# Patient Record
Sex: FEMALE | Race: WHITE | NOT HISPANIC OR LATINO | Employment: OTHER | URBAN - METROPOLITAN AREA
[De-identification: names, ages, dates, MRNs, and addresses within clinical notes are randomized per-mention and may not be internally consistent; named-entity substitution may affect disease eponyms.]

---

## 2017-01-03 ENCOUNTER — OFFICE VISIT (OUTPATIENT)
Dept: WOUND CARE | Facility: HOSPITAL | Age: 57
End: 2017-01-03
Attending: SURGERY

## 2017-01-03 NOTE — WOUND CARE CLINIC NOTE
DATE OF SERVICE:   01/03/2017    ADMITTING DIAGNOSIS:  Chronic left olecranon ulcer.     HISTORY OF PRESENT ILLNESS: This 56-year-old female has a history of a chronic left olecranon ulcer. The patient underwent debridement on her last visit. She currently is on a Dakin dressing change at the site. The patient reports she is doing well. She has no history of fever or chills.     PHYSICAL EXAMINATION:  GENERAL: On today's exam, the patient is awake and alert in no apparent distress.   VITAL SIGNS: Temperature is 97.6, pulse 83, respirations 18, blood pressure 148/80.  9  EXTREMITIES: Over the left olecranon region the ulcer measures 1.5 x 1.0 cm.  There is slough centrally. No erythema. Drainage is serosanguineous. The ulcer appears to be kwasi over the border.     ASSESSMENT: The patient's ulcer would benefit from debridement on today's visit to remove additional necrotic tissue and promote healing. The patient will need to continue with her current dressing care.     DESCRIPTION OF PROCEDURE: The patient was placed supine on the procedure table. The left olecranon ulcer was prepped with a wound prep and anesthetized with a 4% lidocaine gel. After adequate anesthesia, the ulcer was debrided to the subcutaneous tissue plane in an excisional manner with #4 curette. The debridement zone was 1.5 x 1.1 cm.  This was performed through viable subcutaneous tissue including the removal of viable subcutaneous tissue. Postdebridement measurements were 1.5 x 1.1 cm with a depth of 0.8 cm.  Punctate bleeding was encountered. Pressure was used to control hemorrhage. The patient tolerated the procedure well.       ZACKARY Flannery:sona  D:   01/03/2017 09:25:53  T:   01/03/2017 09:46:22  Job ID:   [id number]  Document ID:   [id number]  Rev:  0  cc:   America Barragan M.D.

## 2017-01-10 ENCOUNTER — OFFICE VISIT (OUTPATIENT)
Dept: WOUND CARE | Facility: HOSPITAL | Age: 57
End: 2017-01-10
Attending: SURGERY

## 2017-01-11 NOTE — WOUND CARE CLINIC NOTE
DATE OF SERVICE: 01/10/2017    ADMITTING DIAGNOSES: Chronic left olecranon ulcer.     HISTORY OF PRESENT ILLNESS: This 56-year-old female has a history of a chronic left olecranon ulcer. The patient underwent debridement of her ulcer in the past. The patient is doing well with the Dakin’s dressing on today's visit.    PHYSICAL EXAMINATION:   GENERAL: The patient is awake, alert, in no apparent distress.   VITAL SIGNS: Temperature is 97.8, pulse 83, respirations 24, blood pressure is 178/80.   EXTREMITIES: Over the left olecranon, the ulcer measures 1.5 x 1 cm. There is healthy granular base. No erythema. Drainage is serosanguineous. There is less edema to the olecranon region.     ASSESSMENT AND PLAN: Mrs. Colón’s left olecranon ulcer is progressing well; however, she will require continued debridement to help close the ulcer. The patient will need to continue with her current dressing.     DESCRIPTION OF PROCEDURE: The patient was placed in the right lateral decubitus position. The left olecranon ulcer was prepped with wound prep and anesthetized with 4% lidocaine gel. After adequate anesthesia, the ulcer was debrided through the subcutaneous tissue plane in an excisional manner with a #4 curette. Debridement zone was 1.6 x 1.2 cm. This was performed through viable subcutaneous tissue and includes removal of viable subcutaneous tissue. Punctate bleeding was encountered. Pressure was utilized to control hemorrhage. Postdebridement measurements were 1.6 x 1.2 cm with a depth of 0.3 cm. Dressings were applied. The patient tolerated the procedure well.       Herson Godfrey M.D.  RM:dwight  D:   01/10/2017 10:16:10  T:   01/10/2017 23:09:09  Job ID:   [id number]  Document ID:   [id number]  Rev:  0  cc:   America Barragan M.D.

## 2017-01-24 ENCOUNTER — OFFICE VISIT (OUTPATIENT)
Dept: WOUND CARE | Facility: HOSPITAL | Age: 57
End: 2017-01-24
Attending: SURGERY

## 2017-01-25 NOTE — WOUND CARE CLINIC NOTE
DATE OF SERVICE: 01/24/2017    ADMITTING DIAGNOSIS: Chronic left olecranon ulcer.     HISTORY OF PRESENT ILLNESS: This 56-year-old female returns to the Wound Care Clinic for followup of her left chronic olecranon ulcer. The patient reports she is doing well with her Dakin’s dressing. She had some dryness noted over the peripheral border of the ulcer.    PHYSICAL EXAMINATION:   GENERAL: On today's exam, the patient is awake, alert, in no apparent distress.   VITAL SIGNS: Temperature is 97.7, pulse 86, respirations 18, blood pressure is 155/83.   EXTREMITIES: Over the left extremity olecranon region, the ulcer measures 1.5 x 0.9 cm. There is a granular base. No erythema. There is a shallow tunnel distally approximately 0.8 cm.     ASSESSMENT AND PLAN: Mrs. Colón’s ulcer is progressing well. On today's visit, the ulcer will require debridement through the subcutaneous tissue plane to remove additional tissue and promote contracture. The patient will need to continue with her current dressing. She may add a dab of Vaseline to the peripheral border to help control the drying effect of the Dakin’s solution.     DESCRIPTION OF PROCEDURE: The patient was placed supine on the procedure table. The left olecranon region was prepped with a wound prep and anesthetized with 4% lidocaine gel. After adequate anesthesia, the ulcer was debrided through the subcutaneous tissue plain in an excisional manner with a #4 curette. The debridement zone was 1.5 x 0.9 cm. This was performed through viable subcutaneous tissue and included the removal of viable subcutaneous tissue. Punctate bleeding was encountered. Pressure was utilized to control hemorrhage. The post debridement measurements were 1.6 x 0.9 cm with a depth of 0.6 cm. The patient tolerated the procedure well. Dressings were applied.      ZACKARY Flannery:dwight  D:   01/24/2017 10:40:42  T:   01/24/2017 23:51:01  Job ID:   [id number]  Document ID:   [id number]  Rev:   0  cc:   America Barragan M.D.

## 2017-02-07 ENCOUNTER — OFFICE VISIT (OUTPATIENT)
Dept: WOUND CARE | Facility: HOSPITAL | Age: 57
End: 2017-02-07
Attending: SURGERY

## 2017-02-08 NOTE — WOUND CARE CLINIC NOTE
DATE OF SERVICE: 02/07/2017    ADMITTING DIAGNOSIS: Chronic left olecranon ulcer.     HISTORY OF PRESENT ILLNESS: This 56-year-old female returns to the Wound Care Center for followup of her left olecranon chronic ulcer. The patient reports that she is performing her Dakin’s dressing care without problems.     PHYSICAL EXAMINATION:  GENERAL: On today's exam, the patient is awake, alert, in no apparent distress.   VITAL SIGNS: Temperature is 98.1, pulse 93, respirations 20, blood pressure is 162/99.  EXTREMITIES: Over the left olecranon region, the ulcer measures 1.5 x 0.5 cm. There is a granular base. No erythema. Drainage is serosanguineous. There is reepithelialization occurring over the border.    ASSESSMENT AND PLAN: Mrs. Colón's left olecranon ulcer will require debridement on today's visit to promote continued contraction. The patient will need to continue with her current dressing care which consists of a 1/4 strength Dakin’s dressing twice daily. The patient will return to the Wound Care Clinic in 2 weeks.     DESCRIPTION OF PROCEDURE: The patient was placed supine on the procedure table. The left olecranon ulcer was anesthetized with 4% lidocaine gel. After adequate anesthesia, the ulcer was debrided through the subcutaneous tissue plane in an excisional manner with a #4 curette. The debridement zone was 1.6 x 0.7 cm. This was performed through viable subcutaneous tissue and includes removal of viable subcutaneous tissue. Postdebridement measurements were 1.6 x 0.7 cm with a depth of 0.7 cm. The patient tolerated the procedure well. Dressings were applied. Pressure was utilized to control hemorrhage.      ZACKARY Flannery:dwight  D:   02/07/2017 10:05:59  T:   02/07/2017 22:22:10  Job ID:   [id number]  Document ID:   [id number]  Rev:  0  cc:   America Barragan M.D.

## 2017-02-21 ENCOUNTER — OFFICE VISIT (OUTPATIENT)
Dept: WOUND CARE | Facility: HOSPITAL | Age: 57
End: 2017-02-21
Attending: SURGERY

## 2017-02-21 NOTE — WOUND CARE CLINIC NOTE
DATE OF SERVICE:  02/21/2017      ADMITTING DIAGNOSIS: Chronic left olecranon ulcer.     HISTORY OF PRESENT ILLNESS: This 56-year-old female returns to the Wound Care Center for follow up of her chronic olecranon ulcer. The patient reports that she is performing her Dakin’s dressing. She has no history of fever or chills.     PHYSICAL EXAMINATION:  GENERAL: On today's exam, the patient is awake, alert, in no apparent distress.   VITAL SIGNS: Temperature is 98.2, pulse is 84, respirations 18, blood pressure 130/72.   EXTREMITIES: Over the left olecranon region, the ulcer measures 1.4 x 0.6 cm. There are signs of continued contraction over the border. No erythema. Drainage is serosanguineous. There is no sign of deep fluctuance to the olecranon region.     ASSESSMENT AND PLAN:   1.  The patient’s left upper extremity olecranon ulcer appears stable. On today's visit, the ulcer will be debrided.   2.  The patient may need to continue with her Dakin’s dressing.     DESCRIPTION OF PROCEDURE: The patient was placed supine on the procedure table. The left elbow was prepped with a wound prep and anesthetized with 4% lidocaine gel. After adequate anesthesia, the ulcer over the left olecranon region was debrided with a #4 curette through the deep subcutaneous tissue plane in an excisional manner. The debridement zone was 1.4 x 0.7 cm. The patient tolerated the procedure well. This was performed through viable subcutaneous tissue and included removal of viable subcutaneous tissue. Punctate bleeding was encountered. Pressure was utilized to control hemorrhage. Postdebridement measurements were 1.4 x 0.7 cm with a depth of 0.8 cm. Dressings were applied.           Herson Godfrey M.D.  RM:adamaris  D:   02/21/2017 09:19:48  T:   02/21/2017 10:16:29  Job ID:   [id number]  Document ID:   [id number]  Rev:  0  cc:   America Barragan M.D.

## 2017-03-07 ENCOUNTER — OFFICE VISIT (OUTPATIENT)
Dept: WOUND CARE | Facility: HOSPITAL | Age: 57
End: 2017-03-07
Attending: SURGERY

## 2017-03-07 NOTE — WOUND CARE CLINIC NOTE
DATE OF SERVICE: 03/07/2017    ADMITTING DIAGNOSIS: Chronic left olecranon ulcer.     HISTORY OF PRESENT ILLNESS: This 56-year-old female returns to the Wound Care Center for followup of her left chronic olecranon ulcer. The patient reports she is doing well. She has no history of fever or chills.     PHYSICAL EXAMINATION:  GENERAL: On today's exam the patient is awake, alert, in no apparent distress.   VITAL SIGNS: Temperature 97.9, pulse 89, respiratory rate 18, blood pressure 146/98.   EXTREMITIES: Over the left olecranon region, there is an open ulcer. The ulcer measures 1.3 x 0.6 cm. The ulcer will not close due to significant tunneling,  approximately 2 cm in each direction. There is no erythema present.     ASSESSMENT: The patient will require debridement of the ulcer on today's visit through the subcutaneous tissue plane to remove additional tissue and promote closure.     DESCRIPTION OF PROCEDURE: The patient was placed supine on the procedure table. The ulcer was prepped with wound prep. The ulcer was anesthetized over the lateral border of the skin with 1% lidocaine with epinephrine, 10 mL total. After adequate anesthesia, the ulcer was debrided through the subcutaneous tissue plane in an excisional manner with a #10 blade. The debridement zone was 4 x 2 cm. This was performed through viable subcutaneous tissue and included the removal of viable subcutaneous tissue. Punctate bleeding was encountered. Pressure was utilized to control hemorrhage along with silver nitrate. Postdebridement measurements were 4 x 2 cm with a depth of 0.7 cm. The patient tolerated the procedure well. Dressings were applied.         Herson Godfrey M.D.  RM:yordy  D:   03/07/2017 09:27:43  T:   03/07/2017 15:45:31  Job ID:   [id number]  Document ID:   [id number]  Rev:  0  cc:   America Barragan M.D.

## 2017-03-14 ENCOUNTER — OFFICE VISIT (OUTPATIENT)
Dept: WOUND CARE | Facility: HOSPITAL | Age: 57
End: 2017-03-14
Attending: SURGERY

## 2017-03-14 ENCOUNTER — APPOINTMENT (OUTPATIENT)
Dept: WOUND CARE | Facility: HOSPITAL | Age: 57
End: 2017-03-14
Attending: SURGERY

## 2017-03-14 PROCEDURE — G0463 HOSPITAL OUTPT CLINIC VISIT: HCPCS

## 2017-03-15 NOTE — WOUND CARE CLINIC NOTE
DATE OF SERVICE:  03/14/2017    ADMITTING DIAGNOSIS:  Chronic left olecranon ulcer.     HISTORY OF PRESENT ILLNESS: This 56-year-old female returns to the Wound Care Center for followup of her left chronic olecranon ulcer. The patient reports no significant drainage this week. She had some discomfort over the olecranon. She had this region debrided last week. The patient has no fever or chills.     PHYSICAL EXAMINATION:  GENERAL: On today's exam, the patient is awake, alert, in no apparent distress.   VITAL SIGNS: Temperature is 97.4, pulse 88, respiratory rate 18, blood pressure 126/85.   EXTREMITIES: Over the left olecranon, the ulcer measures 3.6 x 1.6 cm.  There is a granular bed forming. No erythema. Drainage is serosanguineous. There appears to be no significant tunneling present.  The site is without new soft tissue loss.     ASSESSMENT AND PLAN: The patient is progressing well. On today's visit, she did not require any debridement. The patient will need to continue with a Dakin based dressing to help provide removal of the bacterial chasity over the region and promote contraction. The patient will need to return to the Wound Care clinic in 2 weeks.       Herson Godfrey M.D.  RM:stephanie  D:   03/14/2017 17:06:28  T:   03/14/2017 20:21:55  Job ID:   [id number]  Document ID:   [id number]  Rev:  0  cc:   America Barragan M.D.

## 2017-03-28 ENCOUNTER — OFFICE VISIT (OUTPATIENT)
Dept: WOUND CARE | Facility: HOSPITAL | Age: 57
End: 2017-03-28
Attending: SURGERY

## 2017-03-28 NOTE — WOUND CARE CLINIC NOTE
ADMITTING DIAGNOSIS: Chronic left olecranon ulcer.     HISTORY OF PRESENT ILLNESS: This 57-year-old female returns to the wound care center for followup of her left olecranon ulcer. The patient reports that she is doing well. She has less drainage from the site. She has no history of fevers.     PHYSICAL EXAMINATION:  GENERAL: On today's exam, the patient is awake, alert, in no apparent distress.   VITAL SIGNS: Temperature is 97.7, pulse is 80, respirations 18, blood pressure is 136/83.   EXTREMITIES: Over the left olecranon, the ulcer measures 3.3 x 1.6 cm. There is evidence of contraction over the border. No erythema. There is a granular base forming centrally.     ASSESSMENT: The patient would benefit from debridement of the ulcer on today's visit with continued use of the same dressing. The patient will need to return to wound care clinic in 2 weeks.      DESCRIPTION OF PROCEDURE: The patient was placed supine on the procedure table. The left olecranon ulcer was prepped with wound prep. The ulcer was anesthetized with 4% lidocaine gel. After adequate anesthesia, the left olecranon ulcer was debrided through the subcutaneous tissue plane in an excisional manner with a #4 curette. Debridement zone was 3.4 x 1.7 cm. This was performed through viable subcutaneous tissue and includes removal of viable subcutaneous tissue. Punctate bleeding was encountered. Postdebridement measurements were 3.4 x 1.7 cm with a depth of  0.5 cm. Dressings were applied.          Herson Godfrey M.D.  RM:buck  D:   03/28/2017 09:14:03  T:   03/28/2017 14:43:34  Job ID:   [id number]  Document ID:   [id number]  Rev:  0  cc:   America Barragan M.D.

## 2017-04-11 ENCOUNTER — OFFICE VISIT (OUTPATIENT)
Dept: WOUND CARE | Facility: HOSPITAL | Age: 57
End: 2017-04-11
Attending: SURGERY

## 2017-04-11 NOTE — WOUND CARE CLINIC NOTE
DATE OF SERVICE: 04/11/2017    ADMITTING DIAGNOSIS: Left chronic olecranon ulcer.     HISTORY OF PRESENT ILLNESS: This 57-year-old female returns to the Wound Care Center for followup of her left olecranon ulcer. The patient is undergoing Dakin’s dressing care to the site twice daily. The patient has no new complaints over the site.     PHYSICAL EXAMINATION:  GENERAL: On today's exam, the patient is awake, alert, in no apparent distress.   VITAL SIGNS: Temperature is 96.8, pulse is 81, respirations 18, blood pressure is 145/83.   SKIN: Over the left olecranon, the ulcer measures 3.1 x 1.6 cm. There is gradual contraction over the border granular base forming. No erythema. There is some tissue centrally which is desiccated. There is no sign of purulence to the ulcer. The floor appears to be rising.     ASSESSMENT AND PLAN: Mrs. Colón is progressing well. On today's visit, the ulcer will require debridement to stimulate new tissue growth and closure. The patient will need to continue with her current dressing.     DESCRIPTION OF PROCEDURE: The patient was placed supine on the procedure table. The left olecranon ulcer was prepped with wound prep. The ulcer was anesthetized with 4% lidocaine gel. After adequate anesthesia, the ulcer over the left elbow was debrided through the subcutaneous tissue plane in an excisional manner with a #4 curette. The debridement zone was 3.2 x 1.7 cm. This was performed through viable subcutaneous tissue and includes removal of viable subcutaneous tissue. Punctate bleeding was encountered. Pressure was utilized to control hemorrhage. Postdebridement measurements were 3.2 x 1.7 cm. The patient tolerated the procedure well. Dressings were applied.       ZACKARY Flannery:dwight  D:   04/11/2017 08:45:38  T:   04/11/2017 18:12:40  Job ID:   [id number]  Document ID:   [id number]  Rev:  0  cc:   Colby Arboleda M.D.

## 2017-05-02 ENCOUNTER — APPOINTMENT (OUTPATIENT)
Dept: WOUND CARE | Facility: HOSPITAL | Age: 57
End: 2017-05-02
Attending: SURGERY

## 2017-05-09 ENCOUNTER — APPOINTMENT (OUTPATIENT)
Dept: WOUND CARE | Facility: HOSPITAL | Age: 57
End: 2017-05-09
Attending: SURGERY

## 2017-06-06 ENCOUNTER — APPOINTMENT (OUTPATIENT)
Dept: WOUND CARE | Facility: HOSPITAL | Age: 57
End: 2017-06-06
Attending: SURGERY

## 2017-06-20 ENCOUNTER — APPOINTMENT (OUTPATIENT)
Dept: WOUND CARE | Facility: HOSPITAL | Age: 57
End: 2017-06-20
Attending: SURGERY

## 2017-07-11 ENCOUNTER — APPOINTMENT (OUTPATIENT)
Dept: WOUND CARE | Facility: HOSPITAL | Age: 57
End: 2017-07-11
Attending: SURGERY

## 2017-08-01 ENCOUNTER — APPOINTMENT (OUTPATIENT)
Dept: WOUND CARE | Facility: HOSPITAL | Age: 57
End: 2017-08-01
Attending: SURGERY

## 2017-08-22 ENCOUNTER — APPOINTMENT (OUTPATIENT)
Dept: WOUND CARE | Facility: HOSPITAL | Age: 57
End: 2017-08-22
Attending: SURGERY

## 2017-09-12 ENCOUNTER — APPOINTMENT (OUTPATIENT)
Dept: WOUND CARE | Facility: HOSPITAL | Age: 57
End: 2017-09-12
Attending: SURGERY

## 2017-10-17 ENCOUNTER — APPOINTMENT (OUTPATIENT)
Dept: WOUND CARE | Facility: HOSPITAL | Age: 57
End: 2017-10-17
Attending: SURGERY

## 2017-11-07 ENCOUNTER — APPOINTMENT (OUTPATIENT)
Dept: WOUND CARE | Facility: HOSPITAL | Age: 57
End: 2017-11-07
Attending: SURGERY

## 2017-11-14 ENCOUNTER — APPOINTMENT (OUTPATIENT)
Dept: WOUND CARE | Facility: HOSPITAL | Age: 57
End: 2017-11-14
Attending: SURGERY

## 2017-12-05 ENCOUNTER — APPOINTMENT (OUTPATIENT)
Dept: WOUND CARE | Facility: HOSPITAL | Age: 57
End: 2017-12-05
Attending: SURGERY

## 2017-12-12 ENCOUNTER — APPOINTMENT (OUTPATIENT)
Dept: WOUND CARE | Facility: HOSPITAL | Age: 57
End: 2017-12-12
Attending: SURGERY

## 2018-01-09 ENCOUNTER — APPOINTMENT (OUTPATIENT)
Dept: WOUND CARE | Facility: HOSPITAL | Age: 58
End: 2018-01-09
Attending: SURGERY

## 2018-03-27 ENCOUNTER — APPOINTMENT (OUTPATIENT)
Dept: WOUND CARE | Facility: HOSPITAL | Age: 58
End: 2018-03-27
Attending: SURGERY

## 2018-03-27 PROCEDURE — G0463 HOSPITAL OUTPT CLINIC VISIT: HCPCS

## 2018-04-10 ENCOUNTER — APPOINTMENT (OUTPATIENT)
Dept: WOUND CARE | Facility: HOSPITAL | Age: 58
End: 2018-04-10
Attending: SURGERY

## 2018-04-24 ENCOUNTER — APPOINTMENT (OUTPATIENT)
Dept: WOUND CARE | Facility: HOSPITAL | Age: 58
End: 2018-04-24
Attending: SURGERY

## 2018-05-08 ENCOUNTER — APPOINTMENT (OUTPATIENT)
Dept: WOUND CARE | Facility: HOSPITAL | Age: 58
End: 2018-05-08
Attending: SURGERY

## 2018-05-22 ENCOUNTER — OFFICE VISIT (OUTPATIENT)
Dept: WOUND CARE | Facility: HOSPITAL | Age: 58
End: 2018-05-22
Attending: SURGERY

## 2018-05-22 DIAGNOSIS — M70.32: Primary | ICD-10-CM

## 2018-05-22 PROCEDURE — 87070 CULTURE OTHR SPECIMN AEROBIC: CPT | Performed by: SURGERY

## 2018-05-22 PROCEDURE — 87205 SMEAR GRAM STAIN: CPT | Performed by: SURGERY

## 2018-05-22 PROCEDURE — 87075 CULTR BACTERIA EXCEPT BLOOD: CPT | Performed by: SURGERY

## 2018-05-24 ENCOUNTER — CLINICAL SUPPORT (OUTPATIENT)
Dept: OTHER | Facility: HOSPITAL | Age: 58
End: 2018-05-24

## 2018-05-24 ENCOUNTER — HOSPITAL ENCOUNTER (OUTPATIENT)
Dept: PET IMAGING | Facility: HOSPITAL | Age: 58
Discharge: HOME | End: 2018-05-24
Admitting: CLINICAL NURSE SPECIALIST

## 2018-05-24 DIAGNOSIS — F17.210 SMOKING GREATER THAN 30 PACK YEARS: ICD-10-CM

## 2018-05-24 DIAGNOSIS — Z12.2 SCREENING FOR MALIGNANT NEOPLASM OF RESPIRATORY ORGAN: ICD-10-CM

## 2018-05-24 DIAGNOSIS — Z12.2 SCREENING FOR MALIGNANT NEOPLASM OF RESPIRATORY ORGAN: Primary | ICD-10-CM

## 2018-05-24 PROCEDURE — G0296 VISIT TO DETERM LDCT ELIG: HCPCS | Performed by: CLINICAL NURSE SPECIALIST

## 2018-05-24 PROCEDURE — G0297 LDCT FOR LUNG CA SCREEN: HCPCS

## 2018-05-25 VITALS — BODY MASS INDEX: 36.18 KG/M2 | HEIGHT: 64 IN | WEIGHT: 211.9 LBS

## 2018-05-25 LAB
BACTERIA SPEC AEROBE CULT: NORMAL
GRAM STN SPEC: NORMAL

## 2018-05-26 NOTE — PROGRESS NOTES
"[facility] Low-Dose Lung Cancer CT Screening Visit    María Colón is a pleasant 58 y.o. female seen today at the request of Dr. Rufino Ruvalcaba in our Multidisciplinary Clinic, being seen for Lung Cancer Screening Counseling and a Shared Decision Making Visit prior to Low-Dose Lung Cancer Screening CT exam.    SMOKING HISTORY:   History   Smoking Status   • Current Every Day Smoker   • Packs/day: 0.50   • Types: Cigarettes   Smokeless Tobacco   • Current User     Comment: Began smoking at age 17.  Smoked 1 ppd for 31 years and .5 ppd for 10 years for a 36 pack year history.       María Colón is currently smoking a half pack per day with a 36 pack year history.  Reports no use of alternate forms of tobacco, marijuana or other substances.  She uses an electronic cigarette but states that she \"doesn't inhale\".  Based on the recommendation of the [place] Preventive Services Task Force, this patient is at high risk for lung cancer and a low-dose CT screening scan is recommended.     We discussed the connection between Radon and Lung Cancer and the availability of free test kits.  She does not have a basement.  The patient reports NO occupational exposure to hazardous chemicals known to increase risk for lung cancer.  Some second-hand smoke exposure as a child with both parents smoking in their home.  NO current second hand exposure.    The patient has had no hemoptysis, unintentional weight loss or increasing shortness of breath. The patient is asymptomatic and has no signs or symptoms of lung cancer.   The patient reports a personal history of a very early stage  Ovarian Cancer.  Additionally, reports family history of lung cancer in her mother.  She has COPD.    Together we discussed the potential benefits and potential harms of being screened for lung cancer including the potential for follow-up diagnostic testing, risk for over diagnosis, false positive rate and total radiation exposure " using the [facility] standardized decision aid. We reviewed the patient's smoking history and counseled on the importance and health benefits of quitting smoking.    Smoking Cessation  DISCUSSION HELD TODAY:     We discussed that there are a number of resources and tobacco cessation interventions to assist with smoking cessation including the 1-800-Quit Now line, Health Department programs, [place] [facility] resources, and use of the [place] Department of Health and Human Services five keys for quitting and quit plan worksheet.  On a scale of zero to ten, the patient rates their motivation and readiness to quit at a 9 out of 10 today.      Recommendations for continued lung cancer screening:      We discussed the NCCN guidelines for lung cancer screening and the patient verbalized understanding that annual screening is recommended until fifteen years beyond smoking as long as they have no other disease or comorbidity that would prevent them from receiving cancer treatments such as surgery should a lung cancer be detected. The [facility] Lung Cancer Screening Shared Decision-Making Tool was utilized as an aid in discussing whether or not screening was right. The patient has decided to proceed with a Low Dose Lung Cancer Screening CT today. The patient is aware that the results of his screening will be shared with the referring provider or PCP as well.       The patient verbalizes understanding that results of this low dose lung CT exam will be called and that assistance will be provided in arranging any necessary follow-up.    After review of the NCCN guidelines and recommendations for ongoing screening, the patient verbalized understanding of recommendations for follow-up. We discussed the importance of adherence to continued annual screening until 15 years beyond smoking or until other life-limiting comorbidities are present. We discussed the impact of comorbidities and ability and willing  to undergo diagnosis and treatment.    The patient has been counseled on the health benefits of quitting tobacco, smoking cessation strategies and resources, as well as the importance of adherence to annual lung cancer low-dose CT screening.     Fatou Gonzalez, MSN, APRN, ACNS-BC, AOCN, CHPN  Clinical Nurse Specialist  [facility]

## 2018-05-27 LAB — BACTERIA SPEC ANAEROBE CULT: NORMAL

## 2018-06-05 ENCOUNTER — APPOINTMENT (OUTPATIENT)
Dept: PREADMISSION TESTING | Facility: HOSPITAL | Age: 58
End: 2018-06-05

## 2018-06-05 ENCOUNTER — OFFICE VISIT (OUTPATIENT)
Dept: WOUND CARE | Facility: HOSPITAL | Age: 58
End: 2018-06-05
Attending: SURGERY

## 2018-06-05 VITALS
HEART RATE: 72 BPM | BODY MASS INDEX: 36.86 KG/M2 | WEIGHT: 208 LBS | SYSTOLIC BLOOD PRESSURE: 142 MMHG | DIASTOLIC BLOOD PRESSURE: 85 MMHG | OXYGEN SATURATION: 95 % | RESPIRATION RATE: 20 BRPM | HEIGHT: 63 IN | TEMPERATURE: 97.7 F

## 2018-06-05 LAB
ANION GAP SERPL CALCULATED.3IONS-SCNC: 14.5 MMOL/L
BUN BLD-MCNC: 17 MG/DL (ref 6–20)
BUN/CREAT SERPL: 24.3 (ref 7–25)
CALCIUM SPEC-SCNC: 9.7 MG/DL (ref 8.6–10.5)
CHLORIDE SERPL-SCNC: 100 MMOL/L (ref 98–107)
CO2 SERPL-SCNC: 25.5 MMOL/L (ref 22–29)
CREAT BLD-MCNC: 0.7 MG/DL (ref 0.57–1)
DEPRECATED RDW RBC AUTO: 43.9 FL (ref 37–54)
ERYTHROCYTE [DISTWIDTH] IN BLOOD BY AUTOMATED COUNT: 12.7 % (ref 11.7–13)
GFR SERPL CREATININE-BSD FRML MDRD: 86 ML/MIN/1.73
GLUCOSE BLD-MCNC: 100 MG/DL (ref 65–99)
HCT VFR BLD AUTO: 45.2 % (ref 35.6–45.5)
HGB BLD-MCNC: 14.8 G/DL (ref 11.9–15.5)
MCH RBC QN AUTO: 31.2 PG (ref 26.9–32)
MCHC RBC AUTO-ENTMCNC: 32.7 G/DL (ref 32.4–36.3)
MCV RBC AUTO: 95.2 FL (ref 80.5–98.2)
PLATELET # BLD AUTO: 266 10*3/MM3 (ref 140–500)
PMV BLD AUTO: 10.4 FL (ref 6–12)
POTASSIUM BLD-SCNC: 4.2 MMOL/L (ref 3.5–5.2)
RBC # BLD AUTO: 4.75 10*6/MM3 (ref 3.9–5.2)
SODIUM BLD-SCNC: 140 MMOL/L (ref 136–145)
WBC NRBC COR # BLD: 6.93 10*3/MM3 (ref 4.5–10.7)

## 2018-06-05 PROCEDURE — 36415 COLL VENOUS BLD VENIPUNCTURE: CPT

## 2018-06-05 PROCEDURE — 85027 COMPLETE CBC AUTOMATED: CPT | Performed by: SURGERY

## 2018-06-05 PROCEDURE — 93010 ELECTROCARDIOGRAM REPORT: CPT | Performed by: INTERNAL MEDICINE

## 2018-06-05 PROCEDURE — 93005 ELECTROCARDIOGRAM TRACING: CPT

## 2018-06-05 PROCEDURE — 97602 WOUND(S) CARE NON-SELECTIVE: CPT

## 2018-06-05 PROCEDURE — 80048 BASIC METABOLIC PNL TOTAL CA: CPT | Performed by: SURGERY

## 2018-06-05 RX ORDER — SIMVASTATIN 20 MG
20 TABLET ORAL NIGHTLY
COMMUNITY
End: 2021-12-13 | Stop reason: SDUPTHER

## 2018-06-05 RX ORDER — MULTIVIT WITH MINERALS/LUTEIN
1000 TABLET ORAL 2 TIMES DAILY
COMMUNITY

## 2018-06-05 RX ORDER — CITALOPRAM 40 MG/1
40 TABLET ORAL NIGHTLY
COMMUNITY
End: 2021-12-13 | Stop reason: SDUPTHER

## 2018-06-05 RX ORDER — FLUTICASONE PROPIONATE 50 MCG
1 SPRAY, SUSPENSION (ML) NASAL DAILY PRN
COMMUNITY
End: 2021-12-13 | Stop reason: SDUPTHER

## 2018-06-05 RX ORDER — SOLIFENACIN SUCCINATE 10 MG/1
10 TABLET, FILM COATED ORAL EVERY MORNING
COMMUNITY
End: 2021-12-13 | Stop reason: SDUPTHER

## 2018-06-05 RX ORDER — LORATADINE 10 MG/1
10 CAPSULE, LIQUID FILLED ORAL DAILY PRN
COMMUNITY

## 2018-06-05 RX ORDER — LOSARTAN POTASSIUM 50 MG/1
50 TABLET ORAL 2 TIMES DAILY
COMMUNITY
End: 2021-12-13 | Stop reason: SDUPTHER

## 2018-06-05 NOTE — DISCHARGE INSTRUCTIONS
Take the following medications the morning of surgery with a small sip of water:    Losartan   protonix    General Instructions:  • Do not eat solid food after midnight the night before surgery.  • You may drink clear liquids day of surgery but must stop at least one hour before your hospital arrival time.  • It is beneficial for you to have a clear drink that contains carbohydrates the day of surgery.  We suggest a 12 to 20 ounce bottle of Gatorade or Powerade for non-diabetic patients or a 12 to 20 ounce bottle of G2 or Powerade Zero for diabetic patients. (Pediatric patients, are not advised to drink a 12 to 20 ounce carbohydrate drink)    Clear liquids are liquids you can see through.  Nothing red in color.     Plain water                               Sports drinks  Sodas                                   Gelatin (Jell-O)  Fruit juices without pulp such as white grape juice and apple juice  Popsicles that contain no fruit or yogurt  Tea or coffee (no cream or milk added)  Gatorade / Powerade  G2 / Powerade Zero    • Infants may have breast milk up to four hours before surgery.  • Infants drinking formula may drink formula up to six hours before surgery.   • Patients who avoid smoking, chewing tobacco and alcohol for 4 weeks prior to surgery have a reduced risk of post-operative complications.  Quit smoking as many days before surgery as you can.  • Do not smoke, use chewing tobacco or drink alcohol the day of surgery.   • If applicable bring your C-PAP/ BI-PAP machine.  • Bring any papers given to you in the doctor’s office.  • Wear clean comfortable clothes and socks.  • Do not wear contact lenses or make-up.  Bring a case for your glasses.   • Bring crutches or walker if applicable.  • Remove all piercings.  Leave jewelry and any other valuables at home.  • Hair extensions with metal clips must be removed prior to surgery.  • The Pre-Admission Testing nurse will instruct you to bring medications if unable to  obtain an accurate list in Pre-Admission Testing.        If you were given a blood bank ID arm band remember to bring it with you the day of surgery.    Preventing a Surgical Site Infection:  • For 2 to 3 days before surgery, avoid shaving with a razor because the razor can irritate skin and make it easier to develop an infection.  • The night prior to surgery sleep in a clean bed with clean clothing.  Do not allow pets to sleep with you.  • Shower on the morning of surgery using a fresh bar of anti-bacterial soap (such as Dial) and clean washcloth.  Dry with a clean towel and dress in clean clothing.  • Ask your surgeon if you will be receiving antibiotics prior to surgery.  • Make sure you, your family, and all healthcare providers clean their hands with soap and water or an alcohol based hand  before caring for you or your wound.    Day of surgery:6/15/18   0545  Upon arrival, a Pre-op nurse and Anesthesiologist will review your health history, obtain vital signs, and answer questions you may have.  The only belongings needed at this time will be your home medications and if applicable your C-PAP/BI-PAP machine.  If you are staying overnight your family can leave the rest of your belongings in the car and bring them to your room later.  A Pre-op nurse will start an IV and you may receive medication in preparation for surgery, including something to help you relax.  Your family will be able to see you in the Pre-op area.  While you are in surgery your family should notify the waiting room  if they leave the waiting room area and provide a contact phone number.    Please be aware that surgery does come with discomfort.  We want to make every effort to control your discomfort so please discuss any uncontrolled symptoms with your nurse.   Your doctor will most likely have prescribed pain medications.      If you are going home after surgery you will receive individualized written care  instructions before being discharged.  A responsible adult must drive you to and from the hospital on the day of your surgery and stay with you for 24 hours.    If you are staying overnight following surgery, you will be transported to your hospital room following the recovery period.  [facility] has all private rooms.    If you have any questions please call Pre-Admission Testing at [phone].  Deductibles and co-payments are collected on the day of service. Please be prepared to pay the required co-pay, deductible or deposit on the day of service as defined by your plan.

## 2018-06-06 RX ORDER — CLINDAMYCIN PHOSPHATE 600 MG/50ML
600 INJECTION INTRAVENOUS EVERY 8 HOURS
Status: DISCONTINUED | OUTPATIENT
Start: 2018-06-15 | End: 2018-06-15 | Stop reason: HOSPADM

## 2018-06-06 NOTE — H&P
History and Physical Exam      58 year old patient with a left olecranon ulcer, she has no new pain over the ulcer. The patient is performing medihoney dressing care to the ulcer, she has no recent problems with fever or chills. The patient has some drainage over the olecranon.      History of Present Illness (HPI)  This 58-year-old female [facility] of her left olecranon ulcer. The patient is  performing her dressing care without difficulty with  Dakin's solution.         Patient History    Allergies  lisinopril      Family History  Cancer - Mother, Heart Disease - Mother, Father, Lung Disease - Mother, Father, Stroke - Father, Mother,   No family history of Diabetes, Hypertension, Kidney Disease, Seizures, Thyroid Problems, Tuberculosis.    Social History  Current every day smoker - 5 cigarettes per day, Marital Status - , Alcohol Use - Rarely - glass of wine occasional, Drug Use - No History, Caffeine Use - Daily - a cup of coffe.    Medical History    Hospitalization/Surgery History - Jehovah's witness, Left elbow Incision and drainage.     Medical And Surgical History Notes  Constitutional Symptoms (General Health)  ear is draining blood    Review of Systems (ROS)  Constitutional Symptoms (General Health)  The patient has no complaints or symptoms.   Eyes  The patient has no complaints or symptoms.   Ear/Nose/Mouth/Throat  The patient has no complaints or symptoms.   Hematologic/Lymphatic  The patient has no complaints or symptoms.   Respiratory  Complains or has symptoms of Shortness of Breath.   Denies complaints or symptoms of Chronic or frequent coughs.   Gastrointestinal  The patient has no complaints or symptoms.   Endocrine  The patient has no complaints or symptoms.   Genitourinary  The patient has no complaints or symptoms.   Immunological  The patient has no complaints or symptoms.   Integumentary (Skin)  Complains or has symptoms of Wounds.   Musculoskeletal  The  patient has no complaints or symptoms.   Neurologic  The patient has no complaints or symptoms.   Oncologic  The patient has no complaints or symptoms.           Medications  Spiriva Respimat 1.25 mcg/actuation solution for inhalation inhalation mist inhalation  simvastatin 20 mg tablet oral tablet oral  losartan 50 mg tablet oral tablet oral  Glucosamine 500 mg tablet oral tablet oral  albuterol sulfate 2.5 mg/3 mL (0.083 %) solution for nebulization inhalation solution for nebulization inhalation  ProAir HFA 90 mcg/actuation aerosol inhaler inhalation HFA aerosol inhaler inhalation  Symbicort 160 mcg-4.5 mcg/actuation HFA aerosol inhaler inhalation HFA aerosol inhaler inhalation  fluticasone 50 mcg/actuation nasal spray,suspension nasal spray,suspension nasal  celecoxib 200 mg capsule oral capsule oral  aspirin 81 mg chewable tablet oral tablet,chewable oral  pantoprazole 40 mg tablet,delayed release oral tablet,delayed release (DR/EC) oral  citalopram 40 mg tablet oral tablet oral  Vesicare 10 mg tablet oral tablet oral  vitamin E 1,000 unit capsule oral capsule oral        Objective    Constitutional  Vitals Time Taken: 9:03 AM, Height: 63 in, Weight: 200 lbs, BMI: 35.4, Temperature: 97.6 °F, Pulse: 79 bpm, Respiratory Rate: 16 breaths/min, Blood Pressure: 149/88 mmHg.       Eyes  pupils equal round and reactive to accomodation.     Ears, Nose, Mouth, and Throat  normal hearing at 5 feet forced wisper.     Respiratory  normal breathing without difficulty. clear to auscultation bilaterally.     Cardiovascular  regular rate and rhythm, normal S1, S2,.     Gastrointestinal (GI)  soft, non-distended, +BS.     Neurological  cranial nerves 2-12 intact.     Psychiatric  this patient is able to make decisions and demonstrates good insight into disease process. Alert and Oriented x 4. good recall to recent and remote information. pleasant and cooperative, affect is full range and appropriate for the circumstances.          Integumentary (Hair, Skin)  Left olecranon: open ulcer with exposed deep viable soft tissue. No erythema present . The ulcer is non edematous..     Wound #1 status is Open. Original cause of wound was Gradually Appeared. The wound is currently classified as a Full Thickness Without Exposed Support Structures wound with etiology of Open Surgical Wound and is located on the Left Elbow. The wound measures 1.3cm length x 0.5cm width x 0.2cm depth; 0.511cm^2 area and 0.102cm^3 volume. There is Fat Layer (Subcutaneous Tissue) Exposed exposed. There is a medium amount of serous drainage noted. The wound margin is distinct with the outline attached to the wound base. There is no granulation within the wound bed. There is a large (%) amount of necrotic tissue within the wound bed including Adherent Slough. The periwound skin appearance had no abnormalities noted for texture. The periwound skin appearance had no abnormalities noted for moisture. The periwound skin appearance had no abnormalities noted for color. Periwound temperature was noted as No Abnormality. The periwound has tenderness on palpation.       Assessment    58 year old patient with a left olecranon ulcer, which has failed to heal . On today's exam the ulcer is clean and does not appear infected. The patient plan to proceed with operative flap closure of the region as it has failed to ranjit despite aggressive ulcer care in the clinic. The patient will proceed with her labs and EKG . The patient will need to be NPO the night before the proceudre . Today I have answered the patients questions regarding her surgery and post op care.        Electronic Signature(s)  Cecil Godfrey MD

## 2018-06-15 ENCOUNTER — ANESTHESIA (OUTPATIENT)
Dept: PERIOP | Facility: HOSPITAL | Age: 58
End: 2018-06-15

## 2018-06-15 ENCOUNTER — ANESTHESIA EVENT (OUTPATIENT)
Dept: PERIOP | Facility: HOSPITAL | Age: 58
End: 2018-06-15

## 2018-06-15 ENCOUNTER — HOSPITAL ENCOUNTER (OUTPATIENT)
Facility: HOSPITAL | Age: 58
Setting detail: HOSPITAL OUTPATIENT SURGERY
Discharge: HOME | End: 2018-06-15
Attending: SURGERY | Admitting: SURGERY

## 2018-06-15 VITALS
TEMPERATURE: 97.1 F | SYSTOLIC BLOOD PRESSURE: 128 MMHG | OXYGEN SATURATION: 94 % | HEART RATE: 69 BPM | RESPIRATION RATE: 16 BRPM | DIASTOLIC BLOOD PRESSURE: 70 MMHG

## 2018-06-15 PROCEDURE — 25010000002 DEXAMETHASONE PER 1 MG: Performed by: NURSE ANESTHETIST, CERTIFIED REGISTERED

## 2018-06-15 PROCEDURE — 25010000002 MIDAZOLAM PER 1 MG: Performed by: ANESTHESIOLOGY

## 2018-06-15 PROCEDURE — 25010000002 ONDANSETRON PER 1 MG: Performed by: NURSE ANESTHETIST, CERTIFIED REGISTERED

## 2018-06-15 PROCEDURE — 25010000002 FENTANYL CITRATE (PF) 100 MCG/2ML SOLUTION: Performed by: NURSE ANESTHETIST, CERTIFIED REGISTERED

## 2018-06-15 PROCEDURE — 25010000002 PROPOFOL 10 MG/ML EMULSION: Performed by: NURSE ANESTHETIST, CERTIFIED REGISTERED

## 2018-06-15 RX ORDER — HYDRALAZINE HYDROCHLORIDE 20 MG/ML
5 INJECTION INTRAMUSCULAR; INTRAVENOUS
Status: DISCONTINUED | OUTPATIENT
Start: 2018-06-15 | End: 2018-06-15 | Stop reason: HOSPADM

## 2018-06-15 RX ORDER — FENTANYL CITRATE 50 UG/ML
50 INJECTION, SOLUTION INTRAMUSCULAR; INTRAVENOUS
Status: DISCONTINUED | OUTPATIENT
Start: 2018-06-15 | End: 2018-06-15 | Stop reason: HOSPADM

## 2018-06-15 RX ORDER — BUPIVACAINE HYDROCHLORIDE AND EPINEPHRINE 2.5; 5 MG/ML; UG/ML
INJECTION, SOLUTION EPIDURAL; INFILTRATION; INTRACAUDAL; PERINEURAL AS NEEDED
Status: DISCONTINUED | OUTPATIENT
Start: 2018-06-15 | End: 2018-06-15 | Stop reason: HOSPADM

## 2018-06-15 RX ORDER — HYDROCODONE BITARTRATE AND ACETAMINOPHEN 7.5; 325 MG/1; MG/1
1 TABLET ORAL ONCE AS NEEDED
Status: DISCONTINUED | OUTPATIENT
Start: 2018-06-15 | End: 2018-06-15 | Stop reason: HOSPADM

## 2018-06-15 RX ORDER — PROMETHAZINE HYDROCHLORIDE 25 MG/ML
6.25 INJECTION, SOLUTION INTRAMUSCULAR; INTRAVENOUS ONCE AS NEEDED
Status: DISCONTINUED | OUTPATIENT
Start: 2018-06-15 | End: 2018-06-15 | Stop reason: HOSPADM

## 2018-06-15 RX ORDER — GINSENG 100 MG
CAPSULE ORAL AS NEEDED
Status: DISCONTINUED | OUTPATIENT
Start: 2018-06-15 | End: 2018-06-15 | Stop reason: HOSPADM

## 2018-06-15 RX ORDER — ONDANSETRON 2 MG/ML
INJECTION INTRAMUSCULAR; INTRAVENOUS AS NEEDED
Status: DISCONTINUED | OUTPATIENT
Start: 2018-06-15 | End: 2018-06-15 | Stop reason: SURG

## 2018-06-15 RX ORDER — MIDAZOLAM HYDROCHLORIDE 1 MG/ML
1 INJECTION INTRAMUSCULAR; INTRAVENOUS
Status: DISCONTINUED | OUTPATIENT
Start: 2018-06-15 | End: 2018-06-15 | Stop reason: HOSPADM

## 2018-06-15 RX ORDER — FLUMAZENIL 0.1 MG/ML
0.2 INJECTION INTRAVENOUS AS NEEDED
Status: DISCONTINUED | OUTPATIENT
Start: 2018-06-15 | End: 2018-06-15 | Stop reason: HOSPADM

## 2018-06-15 RX ORDER — LABETALOL HYDROCHLORIDE 5 MG/ML
5 INJECTION, SOLUTION INTRAVENOUS
Status: DISCONTINUED | OUTPATIENT
Start: 2018-06-15 | End: 2018-06-15 | Stop reason: HOSPADM

## 2018-06-15 RX ORDER — LIDOCAINE HYDROCHLORIDE 10 MG/ML
0.5 INJECTION, SOLUTION EPIDURAL; INFILTRATION; INTRACAUDAL; PERINEURAL ONCE AS NEEDED
Status: COMPLETED | OUTPATIENT
Start: 2018-06-15 | End: 2018-06-15

## 2018-06-15 RX ORDER — PROMETHAZINE HYDROCHLORIDE 25 MG/1
25 SUPPOSITORY RECTAL ONCE AS NEEDED
Status: DISCONTINUED | OUTPATIENT
Start: 2018-06-15 | End: 2018-06-15 | Stop reason: HOSPADM

## 2018-06-15 RX ORDER — EPHEDRINE SULFATE 50 MG/ML
5 INJECTION, SOLUTION INTRAVENOUS ONCE AS NEEDED
Status: DISCONTINUED | OUTPATIENT
Start: 2018-06-15 | End: 2018-06-15 | Stop reason: HOSPADM

## 2018-06-15 RX ORDER — DEXAMETHASONE SODIUM PHOSPHATE 10 MG/ML
INJECTION INTRAMUSCULAR; INTRAVENOUS AS NEEDED
Status: DISCONTINUED | OUTPATIENT
Start: 2018-06-15 | End: 2018-06-15 | Stop reason: SURG

## 2018-06-15 RX ORDER — SODIUM CHLORIDE, SODIUM LACTATE, POTASSIUM CHLORIDE, CALCIUM CHLORIDE 600; 310; 30; 20 MG/100ML; MG/100ML; MG/100ML; MG/100ML
9 INJECTION, SOLUTION INTRAVENOUS CONTINUOUS
Status: DISCONTINUED | OUTPATIENT
Start: 2018-06-15 | End: 2018-06-15 | Stop reason: HOSPADM

## 2018-06-15 RX ORDER — DIPHENHYDRAMINE HYDROCHLORIDE 50 MG/ML
6.25 INJECTION INTRAMUSCULAR; INTRAVENOUS
Status: DISCONTINUED | OUTPATIENT
Start: 2018-06-15 | End: 2018-06-15 | Stop reason: HOSPADM

## 2018-06-15 RX ORDER — FAMOTIDINE 10 MG/ML
20 INJECTION, SOLUTION INTRAVENOUS ONCE
Status: COMPLETED | OUTPATIENT
Start: 2018-06-15 | End: 2018-06-15

## 2018-06-15 RX ORDER — MIDAZOLAM HYDROCHLORIDE 1 MG/ML
2 INJECTION INTRAMUSCULAR; INTRAVENOUS
Status: DISCONTINUED | OUTPATIENT
Start: 2018-06-15 | End: 2018-06-15 | Stop reason: HOSPADM

## 2018-06-15 RX ORDER — PROMETHAZINE HYDROCHLORIDE 25 MG/1
25 TABLET ORAL ONCE AS NEEDED
Status: DISCONTINUED | OUTPATIENT
Start: 2018-06-15 | End: 2018-06-15 | Stop reason: HOSPADM

## 2018-06-15 RX ORDER — ONDANSETRON 2 MG/ML
4 INJECTION INTRAMUSCULAR; INTRAVENOUS ONCE AS NEEDED
Status: DISCONTINUED | OUTPATIENT
Start: 2018-06-15 | End: 2018-06-15 | Stop reason: HOSPADM

## 2018-06-15 RX ORDER — SODIUM CHLORIDE 0.9 % (FLUSH) 0.9 %
1-10 SYRINGE (ML) INJECTION AS NEEDED
Status: DISCONTINUED | OUTPATIENT
Start: 2018-06-15 | End: 2018-06-15 | Stop reason: HOSPADM

## 2018-06-15 RX ORDER — SULFAMETHOXAZOLE AND TRIMETHOPRIM 800; 160 MG/1; MG/1
1 TABLET ORAL 2 TIMES DAILY
Qty: 10 TABLET | Refills: 0 | Status: SHIPPED | OUTPATIENT
Start: 2018-06-15 | End: 2018-08-03

## 2018-06-15 RX ORDER — OXYCODONE HYDROCHLORIDE AND ACETAMINOPHEN 5; 325 MG/1; MG/1
2 TABLET ORAL ONCE AS NEEDED
Status: DISCONTINUED | OUTPATIENT
Start: 2018-06-15 | End: 2018-06-15 | Stop reason: HOSPADM

## 2018-06-15 RX ORDER — HYDROMORPHONE HYDROCHLORIDE 1 MG/ML
0.5 INJECTION, SOLUTION INTRAMUSCULAR; INTRAVENOUS; SUBCUTANEOUS
Status: DISCONTINUED | OUTPATIENT
Start: 2018-06-15 | End: 2018-06-15 | Stop reason: HOSPADM

## 2018-06-15 RX ORDER — FENTANYL CITRATE 50 UG/ML
INJECTION, SOLUTION INTRAMUSCULAR; INTRAVENOUS AS NEEDED
Status: DISCONTINUED | OUTPATIENT
Start: 2018-06-15 | End: 2018-06-15 | Stop reason: SURG

## 2018-06-15 RX ORDER — LIDOCAINE HYDROCHLORIDE 20 MG/ML
INJECTION, SOLUTION INFILTRATION; PERINEURAL AS NEEDED
Status: DISCONTINUED | OUTPATIENT
Start: 2018-06-15 | End: 2018-06-15 | Stop reason: SURG

## 2018-06-15 RX ORDER — OXYCODONE HYDROCHLORIDE AND ACETAMINOPHEN 5; 325 MG/1; MG/1
2 TABLET ORAL EVERY 6 HOURS PRN
Qty: 35 TABLET | Refills: 0 | Status: SHIPPED | OUTPATIENT
Start: 2018-06-15 | End: 2018-08-03

## 2018-06-15 RX ORDER — FENTANYL CITRATE 50 UG/ML
100 INJECTION, SOLUTION INTRAMUSCULAR; INTRAVENOUS
Status: DISCONTINUED | OUTPATIENT
Start: 2018-06-15 | End: 2018-06-15 | Stop reason: HOSPADM

## 2018-06-15 RX ORDER — PROPOFOL 10 MG/ML
VIAL (ML) INTRAVENOUS AS NEEDED
Status: DISCONTINUED | OUTPATIENT
Start: 2018-06-15 | End: 2018-06-15 | Stop reason: SURG

## 2018-06-15 RX ADMIN — ONDANSETRON 4 MG: 2 INJECTION INTRAMUSCULAR; INTRAVENOUS at 08:05

## 2018-06-15 RX ADMIN — PROPOFOL 200 MG: 10 INJECTION, EMULSION INTRAVENOUS at 07:02

## 2018-06-15 RX ADMIN — SODIUM CHLORIDE, POTASSIUM CHLORIDE, SODIUM LACTATE AND CALCIUM CHLORIDE: 600; 310; 30; 20 INJECTION, SOLUTION INTRAVENOUS at 08:27

## 2018-06-15 RX ADMIN — FENTANYL CITRATE 50 MCG: 50 INJECTION INTRAMUSCULAR; INTRAVENOUS at 07:17

## 2018-06-15 RX ADMIN — FAMOTIDINE 20 MG: 10 INJECTION, SOLUTION INTRAVENOUS at 06:39

## 2018-06-15 RX ADMIN — DEXAMETHASONE SODIUM PHOSPHATE 8 MG: 10 INJECTION INTRAMUSCULAR; INTRAVENOUS at 07:27

## 2018-06-15 RX ADMIN — LIDOCAINE HYDROCHLORIDE 80 MG: 20 INJECTION, SOLUTION INFILTRATION; PERINEURAL at 07:02

## 2018-06-15 RX ADMIN — MIDAZOLAM 2 MG: 1 INJECTION INTRAMUSCULAR; INTRAVENOUS at 06:39

## 2018-06-15 RX ADMIN — SODIUM CHLORIDE, POTASSIUM CHLORIDE, SODIUM LACTATE AND CALCIUM CHLORIDE 9 ML/HR: 600; 310; 30; 20 INJECTION, SOLUTION INTRAVENOUS at 06:14

## 2018-06-15 RX ADMIN — CLINDAMYCIN PHOSPHATE 600 MG: 12 INJECTION, SOLUTION INTRAVENOUS at 07:06

## 2018-06-15 RX ADMIN — FENTANYL CITRATE 50 MCG: 50 INJECTION INTRAMUSCULAR; INTRAVENOUS at 07:02

## 2018-06-15 RX ADMIN — LIDOCAINE HYDROCHLORIDE 0.5 ML: 10 INJECTION, SOLUTION EPIDURAL; INFILTRATION; INTRACAUDAL; PERINEURAL at 06:17

## 2018-06-15 NOTE — BRIEF OP NOTE
EXCISION MASS UPPER EXTREMITY  Progress Note    María Colón  6/15/2018    Pre-op Diagnosis:   Left Olecranon Ulcer         Post-Op Diagnosis Codes:  Left Olecranon Ulcer    Procedure/CPT® Codes:      Procedure(s):  DEBRIDEMENT OF LEFT OLECRANON  ULCER 2 x 3 cm  RECONSTRUCTION LEFT FOREARM WITH  FOREARM FLAP 8 x 6 cm    Surgeon(s):  Herson Godfrey MD    Anesthesia: General    Staff:   Circulator: Tara Pappas RN; Irma Cid RN  Scrub Person: Jaspal Gallegos    Estimated Blood Loss: minimal    Urine Voided: * No values recorded between 6/15/2018  6:58 AM and 6/15/2018  8:33 AM *    Specimens:                None      Drains:   Closed/Suction Drain Left Other (Comment) Bulb 10 Fr. (Active)       Findings: None    Complications: None      Herson Godfrey MD     Date: 6/15/2018  Time: 8:38 AM

## 2018-06-15 NOTE — ANESTHESIA PREPROCEDURE EVALUATION
Anesthesia Evaluation     Patient summary reviewed and Nursing notes reviewed   NPO Solid Status: > 8 hours             Airway   Mallampati: II  TM distance: >3 FB  Neck ROM: full  no difficulty expected  Dental - normal exam   (+) upper dentures and lower dentures    Pulmonary - normal exam   (+) COPD severe,     ROS comment: O2 at night  Cardiovascular - normal exam    (+) hypertension,       Neuro/Psych- negative ROS  GI/Hepatic/Renal/Endo    (+) obesity,       Musculoskeletal (-) negative ROS    Abdominal  - normal exam   Substance History - negative use     OB/GYN negative ob/gyn ROS         Other                        Anesthesia Plan    ASA 3     general     intravenous induction   Anesthetic plan and risks discussed with patient.    Plan discussed with CRNA.

## 2018-06-15 NOTE — ANESTHESIA POSTPROCEDURE EVALUATION
Patient: María Colón    Procedure Summary     Date:  06/15/18 Room / Location:   [facility] OR [address] [place] [address] [facility]    Anesthesia Start:  0658 Anesthesia Stop:  0837    Procedure:  DEBRIDEMENT OF OLECRANON  ULCER, RECONSTRUCTION LEFT FOREARM FLAP (Left ) Diagnosis:      Surgeon:  Herson Godfrey MD Provider:  Jermeias Stone MD    Anesthesia Type:  general ASA Status:  3          Anesthesia Type: general  Last vitals  BP   128/70 (06/15/18 0910)   Temp   36.2 °C (97.1 °F) (06/15/18 0900)   Pulse   69 (06/15/18 0910)   Resp   16 (06/15/18 0910)     SpO2   96 % (06/15/18 0900)     Post Anesthesia Care and Evaluation    Patient location during evaluation: bedside  Patient participation: complete - patient participated  Level of consciousness: awake  Pain score: 1  Pain management: adequate  Airway patency: patent  Anesthetic complications: No anesthetic complications    Cardiovascular status: acceptable  Respiratory status: acceptable  Hydration status: acceptable    Comments: --------------------            06/15/18               0910     --------------------   BP:       128/70     Pulse:      69       Resp:       16       Temp:                SpO2:               --------------------

## 2018-06-15 NOTE — ANESTHESIA PROCEDURE NOTES
Airway  Urgency: elective    Airway not difficult    General Information and Staff    Patient location during procedure: OR  Anesthesiologist: CARMEN MATOS  CRNA: RAMSES BELTRAN    Indications and Patient Condition  Indications for airway management: airway protection    Preoxygenated: yes  MILS not maintained throughout  Mask difficulty assessment: 1 - vent by mask    Final Airway Details  Final airway type: supraglottic airway      Successful airway: classic  Size 4    Number of attempts at approach: 1    Additional Comments  Inflated to seal.

## 2018-06-15 NOTE — OP NOTE
Operative Report      Date of Procedure:  6/15/2018      PREOPERATIVE DIAGNOSES:  1. Left Chronic Olecranon Ulcer     POSTOPERATIVE DIAGNOSES:   1. Left  Chronic Olecranon Ulcer          PROCEDURES PERFORMED:  1. Debridement of Left olecranon Ulcer through subcutaneous tissue  2 x 3 cm   2. Reconstruction of  Left Olecranon region with left forearm myofascial flap 8 x 6 cm       SURGEON: LORY Godfrey MD       ANESTHESIA: General.       SPECIMEN: None      DRAIN: None.       ESTIMATED BLOOD LOSS:25cc.       COMPLICATION: None apparent.       INDICATIONS: 58 year old patient with a left olecranon ulcer, she has no new pain over the ulcer. The patient is performing medihoney dressing care to the ulcer, she has no recent problems with fever or chills. The patient has failed to heal the left olecranon ulcer despite aggressive wound care, she is prepared for operative debridement of the left olecranon ulce with local soft tissue myofascial flap closure.        INFORMED CONSENT:  The patient understands the risks and benefits of the procedures and desires to proceed.        DESCRIPTION OF PROCEDURE: The patient was administered clindamycin 600 mg IV in the holding suite. SCD leg pumps were placed while in the holding suite over the legs. The patient entered the operating room, the patient was placed in the  right lateal decubitus position with an axillary roll.   All pressure points were padded.  The patients left arm and forearm were prepped and draped in the usual sterile fashion. The proposed op-site over the olecranon region was infiltrated with 0.25% Marcaine with epinephrine. A total of 40 mL was utilized throughout the entire case.  The large olecranon ulcer over the olecranon was excised, this included excision of the soft tissue of the ulcer with associated subcutaneous tissue, fascia of the extensor carpi ulnarislnaris muscle.     Hemostasis was assured with electrocautery over the op site.  The op site was  washed with Betadine and Bacitracin mixed with normal saline. The large soft tissue defect could not be closed over the olecranon region. A left lateral forearm myofascial flap was outlined based off the perforators of the posterior radial collateral arteries.  This flap was undermined with electrocautery superiorly over the lateral  forearm extensor wad. The lazy S flap was extended over the flexor carpi ulnaris, the flap was 8 x 6 cm and was rotated over the large olecranon region defect in a non tense fashion .   A 10 Fr round CHHAYA drain was placed over the lateral forearm this was secured with 3-0 Nylon suture.   The op site was washed with normal saline and hemostasis was controlled with electrocautery.  The deep extensor fascia was repaired over the ulcer debridement zone with 3-0 Monocryl suture.     The flap was plicated over the fascia of the forearm region, Flexor Carpi Ulnaris and extensor carpi ulnaris with 3-0 Monocryl . The dermal layer of the flap was re approximated with 3-0  Monocryl suture, 4-0 Monocryl subcuticular layer suture and 3-0 Nylon and 4-0 Nylon suture. The closure site was performed in a triple layer fashion and was 8 cm in length.  At the completion of the procedure, the sponge and needle counts were correct. Neosporin was applied to the op site and a dry gauze dressing and a extension splint.    The patient was extubated and transferred to recovery in stable condition. Dr. Godfrey was present throughout the entire operative procedure.               Herson Godfrey M.D.

## 2018-06-18 ENCOUNTER — TRANSCRIBE ORDERS (OUTPATIENT)
Dept: ADMINISTRATIVE | Facility: HOSPITAL | Age: 58
End: 2018-06-18

## 2018-06-18 DIAGNOSIS — R91.1 LUNG NODULE: Primary | ICD-10-CM

## 2018-06-19 ENCOUNTER — OFFICE VISIT (OUTPATIENT)
Dept: WOUND CARE | Facility: HOSPITAL | Age: 58
End: 2018-06-19
Attending: SURGERY

## 2018-06-19 PROCEDURE — G0463 HOSPITAL OUTPT CLINIC VISIT: HCPCS

## 2018-06-26 ENCOUNTER — OFFICE VISIT (OUTPATIENT)
Dept: WOUND CARE | Facility: HOSPITAL | Age: 58
End: 2018-06-26
Attending: SURGERY

## 2018-06-26 PROCEDURE — G0463 HOSPITAL OUTPT CLINIC VISIT: HCPCS

## 2018-07-10 ENCOUNTER — OFFICE VISIT (OUTPATIENT)
Dept: WOUND CARE | Facility: HOSPITAL | Age: 58
End: 2018-07-10
Attending: SURGERY

## 2018-07-10 PROCEDURE — G0463 HOSPITAL OUTPT CLINIC VISIT: HCPCS

## 2018-07-17 ENCOUNTER — OFFICE VISIT (OUTPATIENT)
Dept: WOUND CARE | Facility: HOSPITAL | Age: 58
End: 2018-07-17
Attending: SURGERY

## 2018-07-24 ENCOUNTER — OFFICE VISIT (OUTPATIENT)
Dept: WOUND CARE | Facility: HOSPITAL | Age: 58
End: 2018-07-24
Attending: SURGERY

## 2018-07-24 PROCEDURE — G0463 HOSPITAL OUTPT CLINIC VISIT: HCPCS

## 2018-07-27 ENCOUNTER — TELEPHONE (OUTPATIENT)
Dept: INFECTIOUS DISEASES | Facility: CLINIC | Age: 58
End: 2018-07-27

## 2018-07-27 NOTE — TELEPHONE ENCOUNTER
Adela from wound care called and stated pt wanted to make an appt to see you before her August 9th surgery with Dr Godfrey concerning her left arm wound that is still not healing. She is wanting to get a second opinion from you. Your next available is 8/3. Thanks. Martha

## 2018-07-30 NOTE — TELEPHONE ENCOUNTER
I spoke with pt about setting up a follow up with you and she is actually not needing an appt she says but she is wondering more if you would be willing to reach out to Dr Godfrey/wound clinic to tell them what to culture/test for when he does this surgery. She is even willing to send us pictures via email like she has done in the past she says. Also wanted you to know her WBC is elevated. Just let me know how you would like to proceed.  Thanks. Martha

## 2018-07-30 NOTE — TELEPHONE ENCOUNTER
That sounds reasonable. He or she can contact us after surgery if our assistance is needed.     I am sure he knows what cultures to get. Usually the standard bacterial, fungal ,and AFB cultures.     Thanks

## 2018-07-31 ENCOUNTER — OFFICE VISIT (OUTPATIENT)
Dept: WOUND CARE | Facility: HOSPITAL | Age: 58
End: 2018-07-31
Attending: SURGERY

## 2018-07-31 DIAGNOSIS — Z00.00 ROUTINE GENERAL MEDICAL EXAMINATION AT A HEALTH CARE FACILITY: Primary | ICD-10-CM

## 2018-07-31 PROCEDURE — 87075 CULTR BACTERIA EXCEPT BLOOD: CPT | Performed by: SURGERY

## 2018-07-31 PROCEDURE — 87176 TISSUE HOMOGENIZATION CULTR: CPT | Performed by: SURGERY

## 2018-07-31 PROCEDURE — 87070 CULTURE OTHR SPECIMN AEROBIC: CPT | Performed by: SURGERY

## 2018-07-31 PROCEDURE — 87205 SMEAR GRAM STAIN: CPT | Performed by: SURGERY

## 2018-08-01 ENCOUNTER — TRANSCRIBE ORDERS (OUTPATIENT)
Dept: ADMINISTRATIVE | Facility: HOSPITAL | Age: 58
End: 2018-08-01

## 2018-08-01 DIAGNOSIS — M70.32 OTHER BURSITIS OF ELBOW, LEFT ELBOW: Primary | ICD-10-CM

## 2018-08-02 ENCOUNTER — APPOINTMENT (OUTPATIENT)
Dept: PREADMISSION TESTING | Facility: HOSPITAL | Age: 58
End: 2018-08-02

## 2018-08-03 ENCOUNTER — APPOINTMENT (OUTPATIENT)
Dept: PREADMISSION TESTING | Facility: HOSPITAL | Age: 58
End: 2018-08-03

## 2018-08-03 VITALS
WEIGHT: 205.3 LBS | HEIGHT: 63 IN | RESPIRATION RATE: 16 BRPM | BODY MASS INDEX: 36.38 KG/M2 | OXYGEN SATURATION: 93 % | DIASTOLIC BLOOD PRESSURE: 75 MMHG | TEMPERATURE: 97.6 F | HEART RATE: 70 BPM | SYSTOLIC BLOOD PRESSURE: 144 MMHG

## 2018-08-03 LAB
ANION GAP SERPL CALCULATED.3IONS-SCNC: 13.6 MMOL/L
BACTERIA SPEC AEROBE CULT: NORMAL
BUN BLD-MCNC: 19 MG/DL (ref 6–20)
BUN/CREAT SERPL: 29.2 (ref 7–25)
CALCIUM SPEC-SCNC: 8.8 MG/DL (ref 8.6–10.5)
CHLORIDE SERPL-SCNC: 104 MMOL/L (ref 98–107)
CO2 SERPL-SCNC: 22.4 MMOL/L (ref 22–29)
CREAT BLD-MCNC: 0.65 MG/DL (ref 0.57–1)
DEPRECATED RDW RBC AUTO: 45.4 FL (ref 37–54)
ERYTHROCYTE [DISTWIDTH] IN BLOOD BY AUTOMATED COUNT: 13.3 % (ref 11.7–13)
GFR SERPL CREATININE-BSD FRML MDRD: 94 ML/MIN/1.73
GLUCOSE BLD-MCNC: 99 MG/DL (ref 65–99)
GRAM STN SPEC: NORMAL
HCT VFR BLD AUTO: 43.5 % (ref 35.6–45.5)
HGB BLD-MCNC: 14.2 G/DL (ref 11.9–15.5)
MCH RBC QN AUTO: 30.9 PG (ref 26.9–32)
MCHC RBC AUTO-ENTMCNC: 32.6 G/DL (ref 32.4–36.3)
MCV RBC AUTO: 94.6 FL (ref 80.5–98.2)
PLATELET # BLD AUTO: 277 10*3/MM3 (ref 140–500)
PMV BLD AUTO: 10.2 FL (ref 6–12)
POTASSIUM BLD-SCNC: 4.1 MMOL/L (ref 3.5–5.2)
RBC # BLD AUTO: 4.6 10*6/MM3 (ref 3.9–5.2)
SODIUM BLD-SCNC: 140 MMOL/L (ref 136–145)
WBC NRBC COR # BLD: 8.37 10*3/MM3 (ref 4.5–10.7)

## 2018-08-03 PROCEDURE — 85027 COMPLETE CBC AUTOMATED: CPT | Performed by: SURGERY

## 2018-08-03 PROCEDURE — 36415 COLL VENOUS BLD VENIPUNCTURE: CPT

## 2018-08-03 PROCEDURE — 80048 BASIC METABOLIC PNL TOTAL CA: CPT | Performed by: SURGERY

## 2018-08-03 NOTE — DISCHARGE INSTRUCTIONS
Take the following medications the morning of surgery with a small sip of water: COZAAR, PROTONIX AND SPIRIVA     PLEASE BRING INHALERS WITH YOU TO SURGERY    ARRIVAL TIME 05:30        General Instructions:  • Do not eat solid food after midnight the night before surgery.  • You may drink clear liquids day of surgery but must stop at least one hour before your hospital arrival time.  • It is beneficial for you to have a clear drink that contains carbohydrates the day of surgery.  We suggest a 12 to 20 ounce bottle of Gatorade or Powerade for non-diabetic patients or a 12 to 20 ounce bottle of G2 or Powerade Zero for diabetic patients. (Pediatric patients, are not advised to drink a 12 to 20 ounce carbohydrate drink)    Clear liquids are liquids you can see through.  Nothing red in color.     Plain water                               Sports drinks  Sodas                                   Gelatin (Jell-O)  Fruit juices without pulp such as white grape juice and apple juice  Popsicles that contain no fruit or yogurt  Tea or coffee (no cream or milk added)  Gatorade / Powerade  G2 / Powerade Zero      • Patients who avoid smoking, chewing tobacco and alcohol for 4 weeks prior to surgery have a reduced risk of post-operative complications.  Quit smoking as many days before surgery as you can.  • Do not smoke, use chewing tobacco or drink alcohol the day of surgery.   • Bring any papers given to you in the doctor’s office.  • Wear clean comfortable clothes and socks.  • Do not wear contact lenses or make-up.  Bring a case for your glasses.   • Remove all piercings.  Leave jewelry and any other valuables at home.  • The Pre-Admission Testing nurse will instruct you to bring medications if unable to obtain an accurate list in Pre-Admission Testing.            Preventing a Surgical Site Infection:  • For 2 to 3 days before surgery, avoid shaving with a razor because the razor can irritate skin and make it easier to develop  an infection.    • Any areas of open skin can increase the risk of a post-operative wound infection by allowing bacteria to enter and travel throughout the body.  Notify your surgeon if you have any skin wounds / rashes even if it is not near the expected surgical site.  The area will need assessed to determine if surgery should be delayed until it is healed.  • The night prior to surgery sleep in a clean bed with clean clothing.  Do not allow pets to sleep with you.  • Shower on the morning of surgery using a fresh bar of anti-bacterial soap (such as Dial) and clean washcloth.  Dry with a clean towel and dress in clean clothing.  • Ask your surgeon if you will be receiving antibiotics prior to surgery.  • Make sure you, your family, and all healthcare providers clean their hands with soap and water or an alcohol based hand  before caring for you or your wound.    Day of surgery:  Upon arrival, a Pre-op nurse and Anesthesiologist will review your health history, obtain vital signs, and answer questions you may have.  The only belongings needed at this time will be your home medications and if applicable your C-PAP/BI-PAP machine.  If you are staying overnight your family can leave the rest of your belongings in the car and bring them to your room later.  A Pre-op nurse will start an IV and you may receive medication in preparation for surgery, including something to help you relax.  Your family will be able to see you in the Pre-op area.  While you are in surgery your family should notify the waiting room  if they leave the waiting room area and provide a contact phone number.    Please be aware that surgery does come with discomfort.  We want to make every effort to control your discomfort so please discuss any uncontrolled symptoms with your nurse.   Your doctor will most likely have prescribed pain medications.      If you are going home after surgery you will receive individualized written  care instructions before being discharged.  A responsible adult must drive you to and from the hospital on the day of your surgery and stay with you for 24 hours.    If you are staying overnight following surgery, you will be transported to your hospital room following the recovery period.  [facility] has all private rooms.    You have received a list of surgical assistants for your reference.  If you have any questions please call Pre-Admission Testing at [phone].  Deductibles and co-payments are collected on the day of service. Please be prepared to pay the required co-pay, deductible or deposit on the day of service as defined by your plan.

## 2018-08-05 LAB — BACTERIA SPEC ANAEROBE CULT: NORMAL

## 2018-08-06 ENCOUNTER — HOSPITAL ENCOUNTER (OUTPATIENT)
Dept: MRI IMAGING | Facility: HOSPITAL | Age: 58
Discharge: HOME | End: 2018-08-06
Attending: SURGERY | Admitting: SURGERY

## 2018-08-06 DIAGNOSIS — M70.32 OTHER BURSITIS OF ELBOW, LEFT ELBOW: ICD-10-CM

## 2018-08-06 PROCEDURE — A9577 INJ MULTIHANCE: HCPCS | Performed by: SURGERY

## 2018-08-06 PROCEDURE — 0 GADOBENATE DIMEGLUMINE 529 MG/ML SOLUTION: Performed by: SURGERY

## 2018-08-06 PROCEDURE — 82565 ASSAY OF CREATININE: CPT

## 2018-08-06 PROCEDURE — 73223 MRI JOINT UPR EXTR W/O&W/DYE: CPT

## 2018-08-06 RX ADMIN — GADOBENATE DIMEGLUMINE 18 ML: 529 INJECTION, SOLUTION INTRAVENOUS at 18:18

## 2018-08-07 ENCOUNTER — OFFICE VISIT (OUTPATIENT)
Dept: WOUND CARE | Facility: HOSPITAL | Age: 58
End: 2018-08-07
Attending: SURGERY

## 2018-08-07 LAB — CREAT BLDA-MCNC: 0.8 MG/DL (ref 0.6–1.3)

## 2018-08-07 PROCEDURE — 97602 WOUND(S) CARE NON-SELECTIVE: CPT

## 2018-08-09 RX ORDER — CLINDAMYCIN PHOSPHATE 600 MG/50ML
600 INJECTION INTRAVENOUS EVERY 8 HOURS
Status: COMPLETED | OUTPATIENT
Start: 2018-08-10 | End: 2018-08-10

## 2018-08-09 NOTE — H&P
History and Physical Exam    Chief Complaint  Information obtained from Patient  58 year old patient with a left olecranon ulcer, the patient had her olecranon region debrided and closed with a flap on 6/15/2018.      History of Present Illness (HPI)  This 58 year old patient with a left olecranon ulcer, the patient had her olecranon region debrided and closed with a flap on 6/15/2018.  The patients op site has dehisced and she is now packing the open wound with 1/8 Dakins moist gauze.  The patient's recent culture was negative and her MRI was also negative for bone involvement of the olecranon region.  The patient is prepared for operative debridement with flap closure and placement of a wound vacuum to the op site.       Wound History  Patient presents with 1 open wound that has been present for approximately 15 months. Patient has been treating wound in the following manner: yes. Laboratory tests have been performed in the last month. Patient reportedly has tested positive for an antibiotic resistant organism. Patient reportedly has not tested positive for osteomyelitis. Patient reportedly has not had testing performed to evaluate circulation in the legs.     Patient History  Information obtained from Patient.    Family History  Cancer - Mother, Heart Disease - Mother, Father, Lung Disease - Mother, Father, Stroke - Father, Mother,   No family history of Diabetes, Hypertension, Kidney Disease, Seizures, Thyroid Problems, Tuberculosis.    Social History  Current every day smoker - 5 sticks sa day, Marital Status - , Alcohol Use - Rarely - glass of wine occasional, Drug Use - No History, Caffeine Use - Daily - a cup of coffe.  ymptoms of Fatigue, Fever, Chills, Marked Weight Change.   Eyes  Denies complaints or symptoms of Dry Eyes, Vision Changes, Glasses / Contacts.   Ear/Nose/Mouth/Throat  Denies complaints or symptoms of Chronic sinus problems or rhinitis.   Hematologic/Lymphatic  The patient has no  complaints or symptoms.   Respiratory  Denies complaints or symptoms of Chronic or frequent coughs, Shortness of Breath.   Cardiovascular  Denies complaints or symptoms of Chest pain.   Gastrointestinal  Denies complaints or symptoms of Frequent diarrhea, Nausea, Vomiting.   Endocrine  Denies complaints or symptoms of Heat/cold intolerance.   Genitourinary  Denies complaints or symptoms of Frequent urination.   Immunological  The patient has no complaints or symptoms.   Integumentary (Skin)  Complains or has symptoms of Wounds.   Musculoskeletal  Denies complaints or symptoms of Muscle Pain, Muscle Weakness.   Neurologic  Denies complaints or symptoms of Numbness/parasthesias.   Oncologic  The patient has no complaints or symptoms.   Psychiatric  Denies complaints or symptoms of Claustrophobia, Suicidal.           Objective    Constitutional  Vitals Time Taken: 9:45 AM, Height: 63 in, Weight: 200 lbs, BMI: 35.4, Temperature: 98.0 °F, Pulse: 96 bpm, Respiratory Rate: 20 breaths/min, Blood Pressure: 152/84 mmHg.     Lungs: Mild wheezes, no rhonchi  Cardiac: Regular Rhythm  Abdominal: soft with normoactive bowel sounds.      Integumentary (Hair, Skin)  Left olecranon: ulcer is clean with viable tissue and no sign of purulence . The skin sutures are intact with no edema . The paitent has less discomfort over the region..     Wound #1R status is Open. Original cause of wound was Gradually Appeared. The wound is currently classified as a Full Thickness Without Exposed Support Structures wound with etiology of Open Surgical Wound and is located on the Left Elbow. The wound measures 3.6cm length x 1.7cm width x 0.6cm depth; 4.807cm^2 area and 2.884cm^3 volume. There is Fat Layer (Subcutaneous Tissue) Exposed exposed. There is no undermining noted, however, there is tunneling at 6:00 with a maximum distance of 1.8cm. There is additional tunneling and at 12:00 with a maximum distance of 1cm. There is a small amount of  serosanguineous drainage noted. The wound margin is distinct with the outline attached to the wound base. There is small (1-33%) red granulation within the wound bed. There is a large (%) amount of necrotic tissue within the wound bed including Adherent Slough. The periwound skin appearance had no abnormalities noted for texture. The periwound skin appearance had no abnormalities noted for moisture. The periwound skin appearance had no abnormalities noted for color. Periwound temperature was noted as No Abnormality. The periwound has tenderness on palpation.       Assessment      58 year old patient with a left olecranon ulcer, the patient had her olecranon region debrided and closed with a flap on 6/15/2018. Today the patient has no sign of infection over the left olecranon, the MRI was negative for any deep soft tissue or bone infection . Cultures were negative . Today the patient may benefit from a wound vacuum placement with revision fo the op site . We will begin looking for a new date for a procedure as an outpaient.. The vacuum may help prevent separation of the op site.  The patient will be NPO the night before the procedure, she had her pre op labs.          Electronic Signature(s)  Signed: 8/8/2018 10:24:26 AM By: Cecil Godfrey MD

## 2018-08-10 ENCOUNTER — ANESTHESIA (OUTPATIENT)
Dept: PERIOP | Facility: HOSPITAL | Age: 58
End: 2018-08-10

## 2018-08-10 ENCOUNTER — ANESTHESIA EVENT (OUTPATIENT)
Dept: PERIOP | Facility: HOSPITAL | Age: 58
End: 2018-08-10

## 2018-08-10 ENCOUNTER — HOSPITAL ENCOUNTER (OUTPATIENT)
Facility: HOSPITAL | Age: 58
Setting detail: SURGERY ADMIT
Discharge: HOME | End: 2018-08-10
Attending: SURGERY | Admitting: SURGERY

## 2018-08-10 VITALS
DIASTOLIC BLOOD PRESSURE: 74 MMHG | OXYGEN SATURATION: 92 % | TEMPERATURE: 98.4 F | HEART RATE: 76 BPM | HEIGHT: 63 IN | WEIGHT: 202.5 LBS | SYSTOLIC BLOOD PRESSURE: 112 MMHG | RESPIRATION RATE: 18 BRPM | BODY MASS INDEX: 35.88 KG/M2

## 2018-08-10 PROCEDURE — 25010000002 PROPOFOL 10 MG/ML EMULSION: Performed by: NURSE ANESTHETIST, CERTIFIED REGISTERED

## 2018-08-10 PROCEDURE — 25010000002 MIDAZOLAM PER 1 MG: Performed by: ANESTHESIOLOGY

## 2018-08-10 PROCEDURE — 25010000002 FENTANYL CITRATE (PF) 100 MCG/2ML SOLUTION: Performed by: NURSE ANESTHETIST, CERTIFIED REGISTERED

## 2018-08-10 PROCEDURE — 25010000002 ONDANSETRON PER 1 MG: Performed by: NURSE ANESTHETIST, CERTIFIED REGISTERED

## 2018-08-10 PROCEDURE — 25010000002 DEXAMETHASONE PER 1 MG: Performed by: NURSE ANESTHETIST, CERTIFIED REGISTERED

## 2018-08-10 RX ORDER — MAGNESIUM HYDROXIDE 1200 MG/15ML
LIQUID ORAL AS NEEDED
Status: DISCONTINUED | OUTPATIENT
Start: 2018-08-10 | End: 2018-08-10 | Stop reason: HOSPADM

## 2018-08-10 RX ORDER — EPHEDRINE SULFATE 50 MG/ML
5 INJECTION, SOLUTION INTRAVENOUS ONCE AS NEEDED
Status: DISCONTINUED | OUTPATIENT
Start: 2018-08-10 | End: 2018-08-10 | Stop reason: HOSPADM

## 2018-08-10 RX ORDER — BUPIVACAINE HYDROCHLORIDE AND EPINEPHRINE 2.5; 5 MG/ML; UG/ML
INJECTION, SOLUTION EPIDURAL; INFILTRATION; INTRACAUDAL; PERINEURAL AS NEEDED
Status: DISCONTINUED | OUTPATIENT
Start: 2018-08-10 | End: 2018-08-10 | Stop reason: HOSPADM

## 2018-08-10 RX ORDER — MIDAZOLAM HYDROCHLORIDE 1 MG/ML
2 INJECTION INTRAMUSCULAR; INTRAVENOUS
Status: DISCONTINUED | OUTPATIENT
Start: 2018-08-10 | End: 2018-08-10 | Stop reason: HOSPADM

## 2018-08-10 RX ORDER — PROMETHAZINE HYDROCHLORIDE 25 MG/ML
12.5 INJECTION, SOLUTION INTRAMUSCULAR; INTRAVENOUS ONCE AS NEEDED
Status: DISCONTINUED | OUTPATIENT
Start: 2018-08-10 | End: 2018-08-10 | Stop reason: HOSPADM

## 2018-08-10 RX ORDER — PROMETHAZINE HYDROCHLORIDE 25 MG/1
25 TABLET ORAL ONCE AS NEEDED
Status: DISCONTINUED | OUTPATIENT
Start: 2018-08-10 | End: 2018-08-10 | Stop reason: HOSPADM

## 2018-08-10 RX ORDER — LABETALOL HYDROCHLORIDE 5 MG/ML
5 INJECTION, SOLUTION INTRAVENOUS
Status: DISCONTINUED | OUTPATIENT
Start: 2018-08-10 | End: 2018-08-10 | Stop reason: HOSPADM

## 2018-08-10 RX ORDER — PROMETHAZINE HYDROCHLORIDE 25 MG/1
12.5 TABLET ORAL ONCE AS NEEDED
Status: DISCONTINUED | OUTPATIENT
Start: 2018-08-10 | End: 2018-08-10 | Stop reason: HOSPADM

## 2018-08-10 RX ORDER — HYDROCODONE BITARTRATE AND ACETAMINOPHEN 7.5; 325 MG/1; MG/1
1 TABLET ORAL ONCE AS NEEDED
Status: DISCONTINUED | OUTPATIENT
Start: 2018-08-10 | End: 2018-08-10 | Stop reason: HOSPADM

## 2018-08-10 RX ORDER — OXYCODONE AND ACETAMINOPHEN 7.5; 325 MG/1; MG/1
1 TABLET ORAL ONCE AS NEEDED
Status: COMPLETED | OUTPATIENT
Start: 2018-08-10 | End: 2018-08-10

## 2018-08-10 RX ORDER — SODIUM CHLORIDE, SODIUM LACTATE, POTASSIUM CHLORIDE, CALCIUM CHLORIDE 600; 310; 30; 20 MG/100ML; MG/100ML; MG/100ML; MG/100ML
9 INJECTION, SOLUTION INTRAVENOUS CONTINUOUS
Status: DISCONTINUED | OUTPATIENT
Start: 2018-08-10 | End: 2018-08-10 | Stop reason: HOSPADM

## 2018-08-10 RX ORDER — FENTANYL CITRATE 50 UG/ML
50 INJECTION, SOLUTION INTRAMUSCULAR; INTRAVENOUS
Status: DISCONTINUED | OUTPATIENT
Start: 2018-08-10 | End: 2018-08-10 | Stop reason: HOSPADM

## 2018-08-10 RX ORDER — LIDOCAINE HYDROCHLORIDE 20 MG/ML
INJECTION, SOLUTION INFILTRATION; PERINEURAL AS NEEDED
Status: DISCONTINUED | OUTPATIENT
Start: 2018-08-10 | End: 2018-08-10 | Stop reason: SURG

## 2018-08-10 RX ORDER — MIDAZOLAM HYDROCHLORIDE 1 MG/ML
1 INJECTION INTRAMUSCULAR; INTRAVENOUS
Status: DISCONTINUED | OUTPATIENT
Start: 2018-08-10 | End: 2018-08-10 | Stop reason: HOSPADM

## 2018-08-10 RX ORDER — PROMETHAZINE HYDROCHLORIDE 25 MG/1
25 SUPPOSITORY RECTAL ONCE AS NEEDED
Status: DISCONTINUED | OUTPATIENT
Start: 2018-08-10 | End: 2018-08-10 | Stop reason: HOSPADM

## 2018-08-10 RX ORDER — ONDANSETRON 2 MG/ML
INJECTION INTRAMUSCULAR; INTRAVENOUS AS NEEDED
Status: DISCONTINUED | OUTPATIENT
Start: 2018-08-10 | End: 2018-08-10 | Stop reason: SURG

## 2018-08-10 RX ORDER — SULFAMETHOXAZOLE AND TRIMETHOPRIM 400; 80 MG/1; MG/1
1 TABLET ORAL 2 TIMES DAILY
Qty: 14 TABLET | Refills: 1 | Status: SHIPPED | OUTPATIENT
Start: 2018-08-10 | End: 2021-12-13

## 2018-08-10 RX ORDER — LIDOCAINE HYDROCHLORIDE 10 MG/ML
0.5 INJECTION, SOLUTION EPIDURAL; INFILTRATION; INTRACAUDAL; PERINEURAL ONCE AS NEEDED
Status: DISCONTINUED | OUTPATIENT
Start: 2018-08-10 | End: 2018-08-10 | Stop reason: HOSPADM

## 2018-08-10 RX ORDER — FENTANYL CITRATE 50 UG/ML
INJECTION, SOLUTION INTRAMUSCULAR; INTRAVENOUS AS NEEDED
Status: DISCONTINUED | OUTPATIENT
Start: 2018-08-10 | End: 2018-08-10 | Stop reason: SURG

## 2018-08-10 RX ORDER — FAMOTIDINE 10 MG/ML
20 INJECTION, SOLUTION INTRAVENOUS ONCE
Status: COMPLETED | OUTPATIENT
Start: 2018-08-10 | End: 2018-08-10

## 2018-08-10 RX ORDER — SODIUM CHLORIDE 0.9 % (FLUSH) 0.9 %
1-10 SYRINGE (ML) INJECTION AS NEEDED
Status: DISCONTINUED | OUTPATIENT
Start: 2018-08-10 | End: 2018-08-10 | Stop reason: HOSPADM

## 2018-08-10 RX ORDER — DIAZEPAM 5 MG/1
5 TABLET ORAL EVERY 6 HOURS PRN
Qty: 25 TABLET | Refills: 0 | Status: SHIPPED | OUTPATIENT
Start: 2018-08-10 | End: 2021-12-13

## 2018-08-10 RX ORDER — HYDROMORPHONE HYDROCHLORIDE 1 MG/ML
0.5 INJECTION, SOLUTION INTRAMUSCULAR; INTRAVENOUS; SUBCUTANEOUS
Status: DISCONTINUED | OUTPATIENT
Start: 2018-08-10 | End: 2018-08-10 | Stop reason: HOSPADM

## 2018-08-10 RX ORDER — DIPHENHYDRAMINE HYDROCHLORIDE 50 MG/ML
12.5 INJECTION INTRAMUSCULAR; INTRAVENOUS
Status: DISCONTINUED | OUTPATIENT
Start: 2018-08-10 | End: 2018-08-10 | Stop reason: HOSPADM

## 2018-08-10 RX ORDER — PROPOFOL 10 MG/ML
VIAL (ML) INTRAVENOUS AS NEEDED
Status: DISCONTINUED | OUTPATIENT
Start: 2018-08-10 | End: 2018-08-10 | Stop reason: SURG

## 2018-08-10 RX ORDER — ROCURONIUM BROMIDE 10 MG/ML
INJECTION, SOLUTION INTRAVENOUS AS NEEDED
Status: DISCONTINUED | OUTPATIENT
Start: 2018-08-10 | End: 2018-08-10 | Stop reason: SURG

## 2018-08-10 RX ORDER — SCOLOPAMINE TRANSDERMAL SYSTEM 1 MG/1
1 PATCH, EXTENDED RELEASE TRANSDERMAL ONCE
Status: DISCONTINUED | OUTPATIENT
Start: 2018-08-10 | End: 2018-08-10 | Stop reason: HOSPADM

## 2018-08-10 RX ORDER — ONDANSETRON 2 MG/ML
4 INJECTION INTRAMUSCULAR; INTRAVENOUS ONCE AS NEEDED
Status: DISCONTINUED | OUTPATIENT
Start: 2018-08-10 | End: 2018-08-10 | Stop reason: HOSPADM

## 2018-08-10 RX ORDER — FLUMAZENIL 0.1 MG/ML
0.2 INJECTION INTRAVENOUS AS NEEDED
Status: DISCONTINUED | OUTPATIENT
Start: 2018-08-10 | End: 2018-08-10 | Stop reason: HOSPADM

## 2018-08-10 RX ORDER — OXYCODONE AND ACETAMINOPHEN 7.5; 325 MG/1; MG/1
2 TABLET ORAL EVERY 6 HOURS PRN
Qty: 45 TABLET | Refills: 0 | Status: SHIPPED | OUTPATIENT
Start: 2018-08-10 | End: 2021-12-13

## 2018-08-10 RX ORDER — NALOXONE HCL 0.4 MG/ML
0.2 VIAL (ML) INJECTION AS NEEDED
Status: DISCONTINUED | OUTPATIENT
Start: 2018-08-10 | End: 2018-08-10 | Stop reason: HOSPADM

## 2018-08-10 RX ORDER — DEXAMETHASONE SODIUM PHOSPHATE 10 MG/ML
INJECTION INTRAMUSCULAR; INTRAVENOUS AS NEEDED
Status: DISCONTINUED | OUTPATIENT
Start: 2018-08-10 | End: 2018-08-10 | Stop reason: SURG

## 2018-08-10 RX ADMIN — SUGAMMADEX 100 MG: 100 INJECTION, SOLUTION INTRAVENOUS at 09:02

## 2018-08-10 RX ADMIN — SODIUM CHLORIDE, POTASSIUM CHLORIDE, SODIUM LACTATE AND CALCIUM CHLORIDE 9 ML/HR: 600; 310; 30; 20 INJECTION, SOLUTION INTRAVENOUS at 05:58

## 2018-08-10 RX ADMIN — OXYCODONE HYDROCHLORIDE AND ACETAMINOPHEN 1 TABLET: 7.5; 325 TABLET ORAL at 09:56

## 2018-08-10 RX ADMIN — MIDAZOLAM 1 MG: 1 INJECTION INTRAMUSCULAR; INTRAVENOUS at 06:45

## 2018-08-10 RX ADMIN — SODIUM CHLORIDE, POTASSIUM CHLORIDE, SODIUM LACTATE AND CALCIUM CHLORIDE: 600; 310; 30; 20 INJECTION, SOLUTION INTRAVENOUS at 08:03

## 2018-08-10 RX ADMIN — SCOPOLAMINE 1 PATCH: 1 PATCH, EXTENDED RELEASE TRANSDERMAL at 06:46

## 2018-08-10 RX ADMIN — FENTANYL CITRATE 100 MCG: 50 INJECTION, SOLUTION INTRAMUSCULAR; INTRAVENOUS at 07:20

## 2018-08-10 RX ADMIN — FENTANYL CITRATE 100 MCG: 50 INJECTION, SOLUTION INTRAMUSCULAR; INTRAVENOUS at 09:08

## 2018-08-10 RX ADMIN — ONDANSETRON 4 MG: 2 INJECTION INTRAMUSCULAR; INTRAVENOUS at 07:59

## 2018-08-10 RX ADMIN — ROCURONIUM BROMIDE 50 MG: 10 INJECTION INTRAVENOUS at 07:20

## 2018-08-10 RX ADMIN — FENTANYL CITRATE 50 MCG: 50 INJECTION, SOLUTION INTRAMUSCULAR; INTRAVENOUS at 09:04

## 2018-08-10 RX ADMIN — PROPOFOL 200 MG: 10 INJECTION, EMULSION INTRAVENOUS at 07:20

## 2018-08-10 RX ADMIN — DEXAMETHASONE SODIUM PHOSPHATE 8 MG: 10 INJECTION INTRAMUSCULAR; INTRAVENOUS at 07:42

## 2018-08-10 RX ADMIN — LIDOCAINE HYDROCHLORIDE 100 MG: 20 INJECTION, SOLUTION INFILTRATION; PERINEURAL at 07:20

## 2018-08-10 RX ADMIN — FAMOTIDINE 20 MG: 10 INJECTION INTRAVENOUS at 06:45

## 2018-08-10 RX ADMIN — CLINDAMYCIN PHOSPHATE 600 MG: 12 INJECTION, SOLUTION INTRAVENOUS at 07:30

## 2018-08-10 NOTE — BRIEF OP NOTE
INCISION AND DRAINAGE UPPER EXTREMITY  Progress Note    María Colón  8/10/2018    Pre-op Diagnosis:   Left Forearm Open Wound       Post-Op Diagnosis Codes:   Left Forearm Open Wound     Procedure/CPT® Codes:      Procedure(s):  1. Left Forearm   soft tissue debridement 6 x 8 cm   2. Complex Closure of left forearm 10 cm   3. Intra op placement of wound vacuum left forearm    Surgeon(s):  Herson Godfrey MD    Anesthesia: General    Staff:   Circulator: Lebron Calderon RN; Kenzie Baron RN  Scrub Person: Hipolito Llanos    Estimated Blood Loss: 10 mL    Urine Voided: 0 mL    Specimens:                None      Drains:  Wound Vacuum    Findings:     Complications: None      Herson Godfrey MD     Date: 8/10/2018  Time: 9:28 AM

## 2018-08-10 NOTE — ANESTHESIA PROCEDURE NOTES
Airway  Urgency: elective    Airway not difficult    General Information and Staff    Patient location during procedure: OR  Anesthesiologist: TAWANDA RECINOS  CRNA: CHANA ALVAREZ    Indications and Patient Condition  Indications for airway management: airway protection    Preoxygenated: yes  Mask difficulty assessment: 2 - vent by mask + OA or adjuvant +/- NMBA    Final Airway Details  Final airway type: endotracheal airway      Successful airway: ETT  Cuffed: yes   Successful intubation technique: direct laryngoscopy  Endotracheal tube insertion site: oral  Blade: Calderon  Blade size: #2  ETT size: 7.0 mm  Cormack-Lehane Classification: grade I - full view of glottis  Placement verified by: chest auscultation and capnometry   Cuff volume (mL): 8  Number of attempts at approach: 1    Additional Comments  PreO2 with 100% O2;  FeO2 >85%;  sniff position; easy mask ventilation;  Intubated with no difficultly after eyes taped; Appears atraumatic;  Lips and teeth intact as preop condition;  Airway secured. Connected to ventilator.

## 2018-08-10 NOTE — DISCHARGE INSTRUCTIONS
You were given Percocet (pain pills) today at 9:56 am.       Outpatient Surgery Guidelines, Adult  Outpatient procedures are those for which the person having the procedure is allowed to go home the same day as the procedure. Various procedures are done on an outpatient basis. You should follow some general guidelines if you will be having an outpatient procedure.  AFTER THE  PROCEDURE  After surgery, you will be taken to a recovery area, where your progress will be monitored. If there are no complications, you will be allowed to go home when you are awake, stable, and taking fluids well. You may have numbness around the surgical site. Healing will take some time. You will have tenderness at the surgical site and may have some swelling and bruising. You may also have some nausea.  HOME CARE INSTRUCTIONS  · Do not drive for 24 hours, or as directed by your health care provider. Do not drive while taking prescription pain medicines.  · Do not drink alcohol for 24 hours.  · Do not make important decisions or sign legal documents for 24 hours.  · Plan on having a responsible adult stay with your for 24 hours following your procedure.  · You may resume a normal diet and activities as directed.  · Do not lift anything heavier than 10 pounds (4.5 kg) or play contact sports until your health care provider says it is okay.  · Only take over-the-counter or prescription medicines as directed by your health care provider.  · Follow up with your health care provider as directed.  SEEK MEDICAL CARE IF:  · You have increased bleeding (more than a small spot) from the surgical site.  · You have redness, swelling, or increasing pain in the wound.  · You see pus coming from the wound.  · You have a fever > 101.  · You notice a bad smell coming from the wound or dressing.  · You feel lightheaded or faint.  · You develop a rash.  · You have trouble breathing.  · You develop allergies.  MAKE SURE YOU:  · Understand these  instructions.  · Will watch your condition.  · Will get help right away if you are not doing well or get worse.      Scopolamine Patch  This patch has been applied to the skin behind one of your ears.  It may stay in place up to 24 hours. You may remove it at any time after your surgery; however, it should be removed after you are up and walking around the next day.  This medicine reduces stomach upset. Side effects may include: dry mouth, dizziness, sleepiness, constipation, or upset stomach.  An allergy would show up as: a rash, itching, wheezing or shortness of breath.  Follow these instructions:  1. Do not drink alcohol, drive or operate machinery while taking this medicine.  2. Wear only 1 patch at a time. You can leave the patch on for up to 24 hours.  3. When you remove the patch, fold it in half with the sticky sides together and throw it away. Wash your hands and the area under the patch.  4. Do not touch your eye with your hand if it has touched the patch.  5. Wash your hands well before and after touching the patch.  6. Sit or stand slowly to avoid dizziness.  Call your doctor if you have:  1. Any sign of allergy  2. No relief  3. Trouble passing urine  4. Any new or severe symptoms

## 2018-08-10 NOTE — OP NOTE
Date of Procedure:  8/10/2018      PREOPERATIVE DIAGNOSES:  1. Left Open Wound Forearm      POSTOPERATIVE DIAGNOSES:   1. Left Open Wound Forearm          PROCEDURES PERFORMED:  1. Debridement of Left Forearm  through subcutaneous tissue  6 x 8 cm   2. Reconstruction of  Left Olecranon region with complex closure 10 cm   3. Intra op placement of left forearm wound vacuum      SURGEON: LORY Godfrey MD       ANESTHESIA: General.       SPECIMEN: None      DRAIN: None.       ESTIMATED BLOOD LOSS Minimal        COMPLICATION: None apparent.       INDICATIONS: 58 year old patient with a left olecranon ulcer open wound after previous operative procedure with dehiscence.  The patient is performing dressing care with a Dakin's dressing daily, she is prepared for debridement of the left forearm open wound and complex closure.    A wound vacuum will be used to help prevent dehiscence and promote healing.     INFORMED CONSENT:  The patient understands the risks and benefits of the procedures and desires to proceed.        DESCRIPTION OF PROCEDURE: The patient was administered clindamycin 600 mg IV in the holding suite. SCD leg pumps were placed while in the holding suite over the legs. The patient entered the operating room, the patient was placed in the  right lateal decubitus position with an axillary roll and several pillows.   All pressure points were padded.  The patients left arm and forearm were prepped and draped in the usual sterile fashion with Betadine. The proposed op-site over the olecranon region was infiltrated with 0.25% Marcaine with epinephrine. A total of 40 mL was utilized throughout the entire case.  The large open would existed with granular tissue in the base of the open wound.  The open wound was over the posterior lateral forearm, this area was debrided though the subcutaneous tissue in an excisional fashion, the debridement zone was 6 x 8 cm.  This was performed sharply and included the removal of  subcutaneous tissue, skin and granular tissue.        Hemostasis was assured with electrocautery over the op site.  The op site was washed with Betadine and Betadine mixed in normal saline. The large soft tissue defect was the irrigated and pressure washed with Betadine mixed with 1  Lacated Ringers solution using a [employer] pulse lavage system.  The previous flap was undermined over the  left lateral forearm myofascial flap, the lazy S flap was extended over 2 cm distal over the lexor carpi ulnaris, the flap was over 10 cm in length. It was re rotated over the defect of the forearm in a non tense fashion .  The op site was washed with normal saline and hemostasis was controlled with electrocautery.  The deep extensor fascia was plicated to the entire undersurface of both limbs of the flap with a running Stratfix 3-0 Monocryl suture.   The flap was plicated over the fascia of the forearm region, Flexor Carpi Ulnaris and extensor carpi ulnaris with 3-0 PDS inturrupted surture The closure site was performed in a triple layer fashion and was 10 cm in length.  The dermal layer of the flap was re approximated with 4-0 Monocryl  Stratfix running suture and 4-0 Nylon suture. The complex closure was 10 cm in length.  A [employer] wound vacuum was placed over the distal aspect of the op site away from the flexion crease to remove any excess fluid and help the op site heal given the complexity of the open wound.  DuoDerm was placed over the border, the vacuum was placed on 125 mmHg continuous suction.   At the completion of the procedure, the sponge and needle counts were correct. Neosporin was applied to the op site and a dry gauze dressing and a extension splint.    The patient was extubated and transferred to recovery in stable condition. Dr. Godfrey was present throughout the entire operative procedure.                Herson Godfrey M.D.

## 2018-08-10 NOTE — ANESTHESIA POSTPROCEDURE EVALUATION
Patient: María Colón    Procedure Summary     Date:  08/10/18 Room / Location:  [facility] OR  / [facility] MAIN OR    Anesthesia Start:  0711 Anesthesia Stop:  0924    Procedure:  Left  soft tissue debridment, closure and placement of wound vacuum (Left Abdomen) Diagnosis:      Surgeon:  Herson Godfrey MD Provider:  John Dela Cruz MD    Anesthesia Type:  general ASA Status:  3          Anesthesia Type: general  Last vitals  BP   137/63 (08/10/18 1000)   Temp   36.9 °C (98.4 °F) (08/10/18 0922)   Pulse   76 (08/10/18 1010)   Resp   18 (08/10/18 1010)     SpO2   92 % (08/10/18 1010)     Post Anesthesia Care and Evaluation    Patient location during evaluation: PHASE II  Patient participation: complete - patient participated  Level of consciousness: awake  Pain management: adequate  Airway patency: patent  Anesthetic complications: No anesthetic complications    Cardiovascular status: acceptable  Respiratory status: acceptable  Hydration status: acceptable

## 2018-08-10 NOTE — ANESTHESIA PREPROCEDURE EVALUATION
Anesthesia Evaluation     Patient summary reviewed and Nursing notes reviewed   NPO Solid Status: > 8 hours  NPO Liquid Status: > 2 hours           Airway   Mallampati: II  TM distance: >3 FB  Neck ROM: full  Dental - normal exam   (+) edentulous    Pulmonary - normal exam    breath sounds clear to auscultation  (+) a smoker Current, COPD,   Cardiovascular - normal exam    Rhythm: regular  Rate: normal    (+) hypertension, hyperlipidemia,   (-) angina, orthopnea, PND, MARTIN      Neuro/Psych  (+) psychiatric history Anxiety and Depression,     GI/Hepatic/Renal/Endo    (+) obesity,  GERD,      Musculoskeletal     (+) neck pain,   Abdominal    Substance History - negative use     OB/GYN negative ob/gyn ROS         Other   (+) arthritis (Rheumatoid arthritis )                   Anesthesia Plan    ASA 3     general     intravenous induction   Anesthetic plan and risks discussed with patient.

## 2018-08-14 ENCOUNTER — OFFICE VISIT (OUTPATIENT)
Dept: WOUND CARE | Facility: HOSPITAL | Age: 58
End: 2018-08-14
Attending: SURGERY

## 2018-08-14 PROCEDURE — 97605 NEG PRS WND THER DME<=50SQCM: CPT

## 2018-08-17 ENCOUNTER — OFFICE VISIT (OUTPATIENT)
Dept: WOUND CARE | Facility: HOSPITAL | Age: 58
End: 2018-08-17

## 2018-08-17 ENCOUNTER — APPOINTMENT (OUTPATIENT)
Dept: WOUND CARE | Facility: HOSPITAL | Age: 58
End: 2018-08-17

## 2018-08-17 PROCEDURE — G0463 HOSPITAL OUTPT CLINIC VISIT: HCPCS

## 2018-08-21 ENCOUNTER — OFFICE VISIT (OUTPATIENT)
Dept: WOUND CARE | Facility: HOSPITAL | Age: 58
End: 2018-08-21
Attending: SURGERY

## 2018-08-21 PROCEDURE — G0463 HOSPITAL OUTPT CLINIC VISIT: HCPCS

## 2018-08-24 ENCOUNTER — OFFICE VISIT (OUTPATIENT)
Dept: WOUND CARE | Facility: HOSPITAL | Age: 58
End: 2018-08-24
Attending: SURGERY

## 2018-08-24 PROCEDURE — G0463 HOSPITAL OUTPT CLINIC VISIT: HCPCS

## 2018-08-28 ENCOUNTER — OFFICE VISIT (OUTPATIENT)
Dept: WOUND CARE | Facility: HOSPITAL | Age: 58
End: 2018-08-28
Attending: SURGERY

## 2018-08-28 PROCEDURE — G0463 HOSPITAL OUTPT CLINIC VISIT: HCPCS

## 2018-08-31 ENCOUNTER — OFFICE VISIT (OUTPATIENT)
Dept: WOUND CARE | Facility: HOSPITAL | Age: 58
End: 2018-08-31

## 2018-08-31 PROCEDURE — G0463 HOSPITAL OUTPT CLINIC VISIT: HCPCS

## 2018-09-04 ENCOUNTER — OFFICE VISIT (OUTPATIENT)
Dept: WOUND CARE | Facility: HOSPITAL | Age: 58
End: 2018-09-04
Attending: SURGERY

## 2018-09-04 PROCEDURE — G0463 HOSPITAL OUTPT CLINIC VISIT: HCPCS

## 2018-09-07 ENCOUNTER — OFFICE VISIT (OUTPATIENT)
Dept: WOUND CARE | Facility: HOSPITAL | Age: 58
End: 2018-09-07

## 2018-09-07 PROCEDURE — G0463 HOSPITAL OUTPT CLINIC VISIT: HCPCS

## 2018-09-11 ENCOUNTER — OFFICE VISIT (OUTPATIENT)
Dept: WOUND CARE | Facility: HOSPITAL | Age: 58
End: 2018-09-11
Attending: SURGERY

## 2018-09-14 ENCOUNTER — OFFICE VISIT (OUTPATIENT)
Dept: WOUND CARE | Facility: HOSPITAL | Age: 58
End: 2018-09-14

## 2018-09-14 PROCEDURE — G0463 HOSPITAL OUTPT CLINIC VISIT: HCPCS

## 2018-09-17 ENCOUNTER — HOSPITAL ENCOUNTER (OUTPATIENT)
Dept: CT IMAGING | Facility: HOSPITAL | Age: 58
Discharge: HOME | End: 2018-09-17
Attending: INTERNAL MEDICINE | Admitting: INTERNAL MEDICINE

## 2018-09-17 DIAGNOSIS — R91.1 LUNG NODULE: ICD-10-CM

## 2018-09-17 LAB — CREAT BLDA-MCNC: 0.7 MG/DL (ref 0.6–1.3)

## 2018-09-17 PROCEDURE — 25010000002 IOPAMIDOL 61 % SOLUTION: Performed by: INTERNAL MEDICINE

## 2018-09-17 PROCEDURE — 82565 ASSAY OF CREATININE: CPT | Performed by: INTERNAL MEDICINE

## 2018-09-17 PROCEDURE — 71260 CT THORAX DX C+: CPT

## 2018-09-17 RX ADMIN — IOPAMIDOL 75 ML: 612 INJECTION, SOLUTION INTRAVENOUS at 09:30

## 2018-09-18 ENCOUNTER — OFFICE VISIT (OUTPATIENT)
Dept: WOUND CARE | Facility: HOSPITAL | Age: 58
End: 2018-09-18
Attending: SURGERY

## 2018-09-18 PROCEDURE — G0463 HOSPITAL OUTPT CLINIC VISIT: HCPCS

## 2018-09-21 ENCOUNTER — OFFICE VISIT (OUTPATIENT)
Dept: WOUND CARE | Facility: HOSPITAL | Age: 58
End: 2018-09-21

## 2018-09-21 PROCEDURE — G0463 HOSPITAL OUTPT CLINIC VISIT: HCPCS

## 2018-09-25 ENCOUNTER — OFFICE VISIT (OUTPATIENT)
Dept: WOUND CARE | Facility: HOSPITAL | Age: 58
End: 2018-09-25
Attending: SURGERY

## 2018-09-28 ENCOUNTER — OFFICE VISIT (OUTPATIENT)
Dept: WOUND CARE | Facility: HOSPITAL | Age: 58
End: 2018-09-28

## 2018-09-28 PROCEDURE — G0463 HOSPITAL OUTPT CLINIC VISIT: HCPCS

## 2018-10-02 ENCOUNTER — OFFICE VISIT (OUTPATIENT)
Dept: WOUND CARE | Facility: HOSPITAL | Age: 58
End: 2018-10-02
Attending: SURGERY

## 2018-10-05 ENCOUNTER — OFFICE VISIT (OUTPATIENT)
Dept: WOUND CARE | Facility: HOSPITAL | Age: 58
End: 2018-10-05

## 2018-10-05 PROCEDURE — G0463 HOSPITAL OUTPT CLINIC VISIT: HCPCS

## 2018-10-09 ENCOUNTER — OFFICE VISIT (OUTPATIENT)
Dept: WOUND CARE | Facility: HOSPITAL | Age: 58
End: 2018-10-09
Attending: SURGERY

## 2018-10-09 DIAGNOSIS — Z00.00 ROUTINE GENERAL MEDICAL EXAMINATION AT A HEALTH CARE FACILITY: Primary | ICD-10-CM

## 2018-10-09 PROCEDURE — 87075 CULTR BACTERIA EXCEPT BLOOD: CPT | Performed by: SURGERY

## 2018-10-09 PROCEDURE — 87205 SMEAR GRAM STAIN: CPT | Performed by: SURGERY

## 2018-10-09 PROCEDURE — 87070 CULTURE OTHR SPECIMN AEROBIC: CPT | Performed by: SURGERY

## 2018-10-12 ENCOUNTER — APPOINTMENT (OUTPATIENT)
Dept: WOUND CARE | Facility: HOSPITAL | Age: 58
End: 2018-10-12

## 2018-10-12 LAB
BACTERIA SPEC AEROBE CULT: NORMAL
GRAM STN SPEC: NORMAL
GRAM STN SPEC: NORMAL

## 2018-10-14 LAB — BACTERIA SPEC ANAEROBE CULT: NORMAL

## 2018-10-16 ENCOUNTER — OFFICE VISIT (OUTPATIENT)
Dept: WOUND CARE | Facility: HOSPITAL | Age: 58
End: 2018-10-16
Attending: SURGERY

## 2018-10-23 ENCOUNTER — OFFICE VISIT (OUTPATIENT)
Dept: WOUND CARE | Facility: HOSPITAL | Age: 58
End: 2018-10-23
Attending: SURGERY

## 2018-10-23 PROCEDURE — G0463 HOSPITAL OUTPT CLINIC VISIT: HCPCS

## 2018-10-30 ENCOUNTER — OFFICE VISIT (OUTPATIENT)
Dept: WOUND CARE | Facility: HOSPITAL | Age: 58
End: 2018-10-30
Attending: SURGERY

## 2018-10-30 PROCEDURE — G0463 HOSPITAL OUTPT CLINIC VISIT: HCPCS

## 2018-11-06 ENCOUNTER — OFFICE VISIT (OUTPATIENT)
Dept: WOUND CARE | Facility: HOSPITAL | Age: 58
End: 2018-11-06
Attending: SURGERY

## 2018-11-06 PROCEDURE — G0463 HOSPITAL OUTPT CLINIC VISIT: HCPCS

## 2018-11-13 ENCOUNTER — OFFICE VISIT (OUTPATIENT)
Dept: WOUND CARE | Facility: HOSPITAL | Age: 58
End: 2018-11-13
Attending: SURGERY

## 2018-11-20 ENCOUNTER — OFFICE VISIT (OUTPATIENT)
Dept: WOUND CARE | Facility: HOSPITAL | Age: 58
End: 2018-11-20
Attending: SURGERY

## 2018-11-27 ENCOUNTER — OFFICE VISIT (OUTPATIENT)
Dept: WOUND CARE | Facility: HOSPITAL | Age: 58
End: 2018-11-27
Attending: SURGERY

## 2018-11-27 PROCEDURE — G0463 HOSPITAL OUTPT CLINIC VISIT: HCPCS

## 2018-12-11 ENCOUNTER — OFFICE VISIT (OUTPATIENT)
Dept: WOUND CARE | Facility: HOSPITAL | Age: 58
End: 2018-12-11
Attending: SURGERY

## 2018-12-11 PROCEDURE — G0463 HOSPITAL OUTPT CLINIC VISIT: HCPCS

## 2018-12-18 ENCOUNTER — OFFICE VISIT (OUTPATIENT)
Dept: WOUND CARE | Facility: HOSPITAL | Age: 58
End: 2018-12-18
Attending: SURGERY

## 2018-12-18 PROCEDURE — G0463 HOSPITAL OUTPT CLINIC VISIT: HCPCS

## 2019-01-08 ENCOUNTER — OFFICE VISIT (OUTPATIENT)
Dept: WOUND CARE | Facility: HOSPITAL | Age: 59
End: 2019-01-08
Attending: SURGERY

## 2019-01-08 PROCEDURE — G0463 HOSPITAL OUTPT CLINIC VISIT: HCPCS

## 2019-01-22 ENCOUNTER — OFFICE VISIT (OUTPATIENT)
Dept: WOUND CARE | Facility: HOSPITAL | Age: 59
End: 2019-01-22
Attending: SURGERY

## 2019-01-22 PROCEDURE — G0463 HOSPITAL OUTPT CLINIC VISIT: HCPCS

## 2019-02-05 ENCOUNTER — OFFICE VISIT (OUTPATIENT)
Dept: WOUND CARE | Facility: HOSPITAL | Age: 59
End: 2019-02-05
Attending: SURGERY

## 2019-02-05 PROCEDURE — G0463 HOSPITAL OUTPT CLINIC VISIT: HCPCS

## 2019-02-19 ENCOUNTER — APPOINTMENT (OUTPATIENT)
Dept: WOUND CARE | Facility: HOSPITAL | Age: 59
End: 2019-02-19

## 2019-12-13 ENCOUNTER — TRANSCRIBE ORDERS (OUTPATIENT)
Dept: ADMINISTRATIVE | Facility: HOSPITAL | Age: 59
End: 2019-12-13

## 2019-12-13 DIAGNOSIS — R91.1 LUNG NODULE: Primary | ICD-10-CM

## 2020-12-02 ENCOUNTER — TRANSCRIBE ORDERS (OUTPATIENT)
Dept: ADMINISTRATIVE | Facility: HOSPITAL | Age: 60
End: 2020-12-02

## 2020-12-02 ENCOUNTER — APPOINTMENT (OUTPATIENT)
Dept: CT IMAGING | Facility: HOSPITAL | Age: 60
End: 2020-12-02

## 2020-12-02 DIAGNOSIS — R91.1 LUNG NODULE: Primary | ICD-10-CM

## 2021-01-04 ENCOUNTER — HOSPITAL ENCOUNTER (OUTPATIENT)
Dept: CT IMAGING | Facility: HOSPITAL | Age: 61
Discharge: HOME | End: 2021-01-04
Admitting: INTERNAL MEDICINE

## 2021-01-04 DIAGNOSIS — R91.1 LUNG NODULE: ICD-10-CM

## 2021-01-04 PROCEDURE — 71250 CT THORAX DX C-: CPT

## 2021-01-18 ENCOUNTER — TRANSCRIBE ORDERS (OUTPATIENT)
Dept: PULMONOLOGY | Facility: HOSPITAL | Age: 61
End: 2021-01-18

## 2021-01-18 DIAGNOSIS — R91.1 LUNG NODULE: Primary | ICD-10-CM

## 2021-03-16 ENCOUNTER — BULK ORDERING (OUTPATIENT)
Dept: CASE MANAGEMENT | Facility: OTHER | Age: 61
End: 2021-03-16

## 2021-03-16 DIAGNOSIS — Z23 IMMUNIZATION DUE: ICD-10-CM

## 2021-04-23 ENCOUNTER — HOSPITAL ENCOUNTER (OUTPATIENT)
Dept: CT IMAGING | Facility: HOSPITAL | Age: 61
Discharge: HOME | End: 2021-04-23
Admitting: INTERNAL MEDICINE

## 2021-04-23 DIAGNOSIS — R91.1 LUNG NODULE: ICD-10-CM

## 2021-04-23 PROCEDURE — 71250 CT THORAX DX C-: CPT

## 2021-05-10 ENCOUNTER — TRANSCRIBE ORDERS (OUTPATIENT)
Dept: ADMINISTRATIVE | Facility: HOSPITAL | Age: 61
End: 2021-05-10

## 2021-05-10 DIAGNOSIS — R91.1 LUNG NODULE: Primary | ICD-10-CM

## 2021-12-13 ENCOUNTER — OFFICE VISIT (OUTPATIENT)
Dept: FAMILY MEDICINE CLINIC | Facility: CLINIC | Age: 61
End: 2021-12-13

## 2021-12-13 VITALS
OXYGEN SATURATION: 93 % | WEIGHT: 212.4 LBS | SYSTOLIC BLOOD PRESSURE: 140 MMHG | HEART RATE: 98 BPM | BODY MASS INDEX: 37.63 KG/M2 | DIASTOLIC BLOOD PRESSURE: 82 MMHG | HEIGHT: 63 IN | TEMPERATURE: 97.1 F

## 2021-12-13 DIAGNOSIS — F43.23 ACUTE ADJUSTMENT DISORDER WITH MIXED ANXIETY AND DEPRESSED MOOD: ICD-10-CM

## 2021-12-13 DIAGNOSIS — E78.2 MIXED HYPERLIPIDEMIA: ICD-10-CM

## 2021-12-13 DIAGNOSIS — I10 PRIMARY HYPERTENSION: ICD-10-CM

## 2021-12-13 DIAGNOSIS — Z99.81 OXYGEN DEPENDENT: ICD-10-CM

## 2021-12-13 DIAGNOSIS — N39.3 URINARY, INCONTINENCE, STRESS FEMALE: ICD-10-CM

## 2021-12-13 DIAGNOSIS — J44.9 CHRONIC OBSTRUCTIVE PULMONARY DISEASE, UNSPECIFIED COPD TYPE (HCC): Primary | ICD-10-CM

## 2021-12-13 DIAGNOSIS — J30.1 SEASONAL ALLERGIC RHINITIS DUE TO POLLEN: ICD-10-CM

## 2021-12-13 DIAGNOSIS — K21.9 GASTROESOPHAGEAL REFLUX DISEASE, UNSPECIFIED WHETHER ESOPHAGITIS PRESENT: ICD-10-CM

## 2021-12-13 DIAGNOSIS — Z79.899 ENCOUNTER FOR LONG-TERM (CURRENT) USE OF MEDICATIONS: ICD-10-CM

## 2021-12-13 PROCEDURE — 99204 OFFICE O/P NEW MOD 45 MIN: CPT | Performed by: FAMILY MEDICINE

## 2021-12-13 RX ORDER — SIMVASTATIN 20 MG
20 TABLET ORAL NIGHTLY
Qty: 90 TABLET | Refills: 1 | Status: SHIPPED | OUTPATIENT
Start: 2021-12-13 | End: 2022-03-22 | Stop reason: SDUPTHER

## 2021-12-13 RX ORDER — LOSARTAN POTASSIUM 50 MG/1
50 TABLET ORAL 2 TIMES DAILY
Qty: 180 TABLET | Refills: 0 | Status: SHIPPED | OUTPATIENT
Start: 2021-12-13 | End: 2022-03-22 | Stop reason: SDUPTHER

## 2021-12-13 RX ORDER — CITALOPRAM 40 MG/1
40 TABLET ORAL NIGHTLY
Qty: 90 TABLET | Refills: 1 | Status: SHIPPED | OUTPATIENT
Start: 2021-12-13 | End: 2022-03-22 | Stop reason: SDUPTHER

## 2021-12-13 RX ORDER — SOLIFENACIN SUCCINATE 10 MG/1
10 TABLET, FILM COATED ORAL EVERY MORNING
Qty: 90 TABLET | Refills: 0 | Status: SHIPPED | OUTPATIENT
Start: 2021-12-13 | End: 2022-03-22 | Stop reason: SDUPTHER

## 2021-12-13 RX ORDER — PANTOPRAZOLE SODIUM 40 MG/1
40 TABLET, DELAYED RELEASE ORAL DAILY
Qty: 90 TABLET | Refills: 1 | Status: SHIPPED | OUTPATIENT
Start: 2021-12-13 | End: 2022-03-22 | Stop reason: SDUPTHER

## 2021-12-13 RX ORDER — FLUTICASONE PROPIONATE 50 MCG
1 SPRAY, SUSPENSION (ML) NASAL DAILY PRN
Qty: 16 G | Refills: 3 | Status: SHIPPED | OUTPATIENT
Start: 2021-12-13 | End: 2022-03-22 | Stop reason: SDUPTHER

## 2021-12-13 NOTE — PROGRESS NOTES
"Chief Complaint  Establish Care    Subjective          María Colón presents to [facility]  Patient is here today to establish care.  She is here for chronic health conditions:    Hypertension.  The patient states that she was diagnosed approximately in the 1990s.  She takes losartan 50 mg twice daily.  Blood pressures have been well controlled.    Hyperlipidemia.  The patient was diagnosed shortly after she was diagnosed with hypertension and she has been on cholesterol medicine for many years.  Currently she takes simvastatin 20 mg daily.  She denies any side effects.    Depression.  Patient takes Celexa 40 mg daily.  Patient is doing well.  Would like to continue it.  No SI/HI    Oxygen dependent COPD.  Patient is still smoking but recently cut back.  She is seeing pulmonology, Dr Rufino Gordillo, and currently she is taking Trelegy and albuterol as needed.    Stress urinary incontinence.  The patient used to take Vesicare which did help her symptoms but she has been out of that for a while.  She has not tried performing Kegel maneuvers to help with her symptoms.      Objective   Vital Signs:   /82 (BP Location: Right arm, Patient Position: Sitting)   Pulse 98   Temp 97.1 °F (36.2 °C)   Ht 160 cm (63\")   Wt 96.3 kg (212 lb 6.4 oz)   SpO2 93%   BMI 37.62 kg/m²     Physical Exam  Vitals and nursing note reviewed.   Constitutional:       Appearance: Normal appearance. She is well-developed. She is obese.   HENT:      Head: Normocephalic and atraumatic.      Right Ear: External ear normal.      Left Ear: External ear normal.      Nose: Nose normal.   Eyes:      General: No scleral icterus.     Conjunctiva/sclera: Conjunctivae normal.   Cardiovascular:      Rate and Rhythm: Normal rate and regular rhythm.      Heart sounds: Normal heart sounds.   Pulmonary:      Effort: Pulmonary effort is normal.      Breath sounds: Normal breath sounds.   Musculoskeletal:      Cervical " back: Normal range of motion and neck supple.      Right lower leg: No edema.      Left lower leg: No edema.   Lymphadenopathy:      Cervical: No cervical adenopathy.   Skin:     General: Skin is warm and dry.      Findings: No rash.   Neurological:      Mental Status: She is alert and oriented to person, place, and time.   Psychiatric:         Mood and Affect: Mood normal.         Behavior: Behavior normal.         Thought Content: Thought content normal.         Judgment: Judgment normal.        Result Review :   The following data was reviewed by: Asuncion Pinto DO on 12/13/2021:  Common labs    Common Labsle 7/13/21 7/13/21 7/13/21    0925 0925 0925   AST (SGOT)   23   ALT (SGPT)  26    WBC 7.61     Hemoglobin 16.5 (A)     Hematocrit 53.1 (A)     Platelets 290     (A) Abnormal value                          Assessment and Plan    Diagnoses and all orders for this visit:    1. Chronic obstructive pulmonary disease, unspecified COPD type (HCC) (Primary)    2. Primary hypertension  -     losartan (COZAAR) 50 MG tablet; Take 1 tablet by mouth 2 (Two) Times a Day.  Dispense: 180 tablet; Refill: 0  -     Comprehensive Metabolic Panel    3. Mixed hyperlipidemia  -     simvastatin (ZOCOR) 20 MG tablet; Take 1 tablet by mouth Every Night.  Dispense: 90 tablet; Refill: 1  -     Lipid Panel    4. Oxygen dependent    5. Gastroesophageal reflux disease, unspecified whether esophagitis present  -     pantoprazole (PROTONIX) 40 MG EC tablet; Take 1 tablet by mouth Daily.  Dispense: 90 tablet; Refill: 1  -     CBC & Differential    6. Acute adjustment disorder with mixed anxiety and depressed mood  -     citalopram (CeleXA) 40 MG tablet; Take 1 tablet by mouth Every Night.  Dispense: 90 tablet; Refill: 1    7. Urinary, incontinence, stress female  -     solifenacin (VESICARE) 10 MG tablet; Take 1 tablet by mouth Every Morning.  Dispense: 90 tablet; Refill: 0    8. Seasonal allergic rhinitis due to pollen  -     fluticasone  (FLONASE) 50 MCG/ACT nasal spray; 1 spray into the nostril(s) as directed by provider Daily As Needed for Rhinitis or Allergies.  Dispense: 16 g; Refill: 3    9. Encounter for long-term (current) use of medications  -     CBC & Differential  -     Comprehensive Metabolic Panel  -     TSH  -     Lipid Panel    Patient is here to establish care for chronic stable COPD, hypertension, hyperlipidemia, reflux, depression, and urinary incontinence.Surveillance labs were obtained today and any medication changes will be made based on lab results and will be called to the patient later this week.        Follow Up   Return in about 3 months (around 3/13/2022) for Medicare Wellness, HTN, Lipid, Pap Smear.  Patient was given instructions and counseling regarding her condition or for health maintenance advice. Please see specific information pulled into the AVS if appropriate.

## 2021-12-14 LAB
ALBUMIN SERPL-MCNC: 4.5 G/DL (ref 3.8–4.9)
ALBUMIN/GLOB SERPL: 1.5 {RATIO} (ref 1.2–2.2)
ALP SERPL-CCNC: 93 IU/L (ref 44–121)
ALT SERPL-CCNC: 24 IU/L (ref 0–32)
AST SERPL-CCNC: 23 IU/L (ref 0–40)
BASOPHILS # BLD AUTO: 0.1 X10E3/UL (ref 0–0.2)
BASOPHILS NFR BLD AUTO: 1 %
BILIRUB SERPL-MCNC: 0.2 MG/DL (ref 0–1.2)
BUN SERPL-MCNC: 16 MG/DL (ref 8–27)
BUN/CREAT SERPL: 23 (ref 12–28)
CALCIUM SERPL-MCNC: 10 MG/DL (ref 8.7–10.3)
CHLORIDE SERPL-SCNC: 101 MMOL/L (ref 96–106)
CHOLEST SERPL-MCNC: 167 MG/DL (ref 100–199)
CO2 SERPL-SCNC: 24 MMOL/L (ref 20–29)
CREAT SERPL-MCNC: 0.7 MG/DL (ref 0.57–1)
EOSINOPHIL # BLD AUTO: 0.1 X10E3/UL (ref 0–0.4)
EOSINOPHIL NFR BLD AUTO: 2 %
ERYTHROCYTE [DISTWIDTH] IN BLOOD BY AUTOMATED COUNT: 12.4 % (ref 11.7–15.4)
GLOBULIN SER CALC-MCNC: 3.1 G/DL (ref 1.5–4.5)
GLUCOSE SERPL-MCNC: 89 MG/DL (ref 65–99)
HCT VFR BLD AUTO: 48.7 % (ref 34–46.6)
HDLC SERPL-MCNC: 47 MG/DL
HGB BLD-MCNC: 16.5 G/DL (ref 11.1–15.9)
IMM GRANULOCYTES # BLD AUTO: 0.1 X10E3/UL (ref 0–0.1)
IMM GRANULOCYTES NFR BLD AUTO: 2 %
LDLC SERPL CALC-MCNC: 77 MG/DL (ref 0–99)
LYMPHOCYTES # BLD AUTO: 1.5 X10E3/UL (ref 0.7–3.1)
LYMPHOCYTES NFR BLD AUTO: 19 %
MCH RBC QN AUTO: 31 PG (ref 26.6–33)
MCHC RBC AUTO-ENTMCNC: 33.9 G/DL (ref 31.5–35.7)
MCV RBC AUTO: 91 FL (ref 79–97)
MONOCYTES # BLD AUTO: 0.7 X10E3/UL (ref 0.1–0.9)
MONOCYTES NFR BLD AUTO: 9 %
NEUTROPHILS # BLD AUTO: 5.4 X10E3/UL (ref 1.4–7)
NEUTROPHILS NFR BLD AUTO: 67 %
PLATELET # BLD AUTO: 341 X10E3/UL (ref 150–450)
POTASSIUM SERPL-SCNC: 4.5 MMOL/L (ref 3.5–5.2)
PROT SERPL-MCNC: 7.6 G/DL (ref 6–8.5)
RBC # BLD AUTO: 5.33 X10E6/UL (ref 3.77–5.28)
SODIUM SERPL-SCNC: 142 MMOL/L (ref 134–144)
TRIGL SERPL-MCNC: 265 MG/DL (ref 0–149)
TSH SERPL DL<=0.005 MIU/L-ACNC: 0.65 UIU/ML (ref 0.45–4.5)
VLDLC SERPL CALC-MCNC: 43 MG/DL (ref 5–40)
WBC # BLD AUTO: 7.9 X10E3/UL (ref 3.4–10.8)

## 2021-12-15 ENCOUNTER — PATIENT ROUNDING (BHMG ONLY) (OUTPATIENT)
Dept: FAMILY MEDICINE CLINIC | Facility: CLINIC | Age: 61
End: 2021-12-15

## 2021-12-17 ENCOUNTER — TELEPHONE (OUTPATIENT)
Dept: FAMILY MEDICINE CLINIC | Facility: CLINIC | Age: 61
End: 2021-12-17

## 2021-12-17 NOTE — TELEPHONE ENCOUNTER
Caller: María Colón    Relationship to Patient: SELF    Phone Number: [phone]     Reason for Call: PATIENT CALLED SAYING SHE IS SUPPOSED TO SIGN A RECORDS RELEASE FORM AT THE OFFICE TODAY. SHE'S STILL STUCK IN [place] WATCHING HER GRANDCHILDREN AND SAID IF SHE'S UNABLE TO COME TODAY SHE WILL COME ON Monday TO SIGN IT.

## 2021-12-17 NOTE — TELEPHONE ENCOUNTER
Pt notified that the forms will be here when she gets here.  If she cant get her today, she can come in on monday

## 2022-03-22 ENCOUNTER — OFFICE VISIT (OUTPATIENT)
Dept: FAMILY MEDICINE CLINIC | Facility: CLINIC | Age: 62
End: 2022-03-22

## 2022-03-22 VITALS
WEIGHT: 212.4 LBS | HEART RATE: 100 BPM | OXYGEN SATURATION: 94 % | HEIGHT: 63 IN | BODY MASS INDEX: 37.63 KG/M2 | SYSTOLIC BLOOD PRESSURE: 142 MMHG | TEMPERATURE: 97.1 F | DIASTOLIC BLOOD PRESSURE: 82 MMHG

## 2022-03-22 DIAGNOSIS — Z12.31 ENCOUNTER FOR SCREENING MAMMOGRAM FOR MALIGNANT NEOPLASM OF BREAST: ICD-10-CM

## 2022-03-22 DIAGNOSIS — Z78.0 MENOPAUSE: ICD-10-CM

## 2022-03-22 DIAGNOSIS — Z12.11 SCREENING FOR MALIGNANT NEOPLASM OF COLON: ICD-10-CM

## 2022-03-22 DIAGNOSIS — F43.23 ACUTE ADJUSTMENT DISORDER WITH MIXED ANXIETY AND DEPRESSED MOOD: ICD-10-CM

## 2022-03-22 DIAGNOSIS — I10 PRIMARY HYPERTENSION: ICD-10-CM

## 2022-03-22 DIAGNOSIS — Z79.899 ENCOUNTER FOR LONG-TERM (CURRENT) USE OF MEDICATIONS: ICD-10-CM

## 2022-03-22 DIAGNOSIS — K63.5 POLYP OF COLON, UNSPECIFIED PART OF COLON, UNSPECIFIED TYPE: ICD-10-CM

## 2022-03-22 DIAGNOSIS — Z13.820 ENCOUNTER FOR SCREENING FOR OSTEOPOROSIS: ICD-10-CM

## 2022-03-22 DIAGNOSIS — N39.3 URINARY, INCONTINENCE, STRESS FEMALE: ICD-10-CM

## 2022-03-22 DIAGNOSIS — Z00.00 ENCOUNTER FOR INITIAL ANNUAL WELLNESS VISIT (AWV) IN MEDICARE PATIENT: Primary | ICD-10-CM

## 2022-03-22 DIAGNOSIS — J44.9 CHRONIC OBSTRUCTIVE PULMONARY DISEASE, UNSPECIFIED COPD TYPE: ICD-10-CM

## 2022-03-22 DIAGNOSIS — K21.9 GASTROESOPHAGEAL REFLUX DISEASE, UNSPECIFIED WHETHER ESOPHAGITIS PRESENT: ICD-10-CM

## 2022-03-22 DIAGNOSIS — E78.2 MIXED HYPERLIPIDEMIA: ICD-10-CM

## 2022-03-22 DIAGNOSIS — J30.1 SEASONAL ALLERGIC RHINITIS DUE TO POLLEN: ICD-10-CM

## 2022-03-22 PROCEDURE — 1170F FXNL STATUS ASSESSED: CPT | Performed by: FAMILY MEDICINE

## 2022-03-22 PROCEDURE — G0402 INITIAL PREVENTIVE EXAM: HCPCS | Performed by: FAMILY MEDICINE

## 2022-03-22 PROCEDURE — 1159F MED LIST DOCD IN RCRD: CPT | Performed by: FAMILY MEDICINE

## 2022-03-22 PROCEDURE — 99214 OFFICE O/P EST MOD 30 MIN: CPT | Performed by: FAMILY MEDICINE

## 2022-03-22 RX ORDER — FLUTICASONE PROPIONATE 50 MCG
1 SPRAY, SUSPENSION (ML) NASAL DAILY PRN
Qty: 16 G | Refills: 3 | Status: SHIPPED | OUTPATIENT
Start: 2022-03-22 | End: 2023-02-27

## 2022-03-22 RX ORDER — PANTOPRAZOLE SODIUM 40 MG/1
40 TABLET, DELAYED RELEASE ORAL DAILY
Qty: 90 TABLET | Refills: 2 | Status: SHIPPED | OUTPATIENT
Start: 2022-03-22 | End: 2022-09-23 | Stop reason: SDUPTHER

## 2022-03-22 RX ORDER — SOLIFENACIN SUCCINATE 10 MG/1
10 TABLET, FILM COATED ORAL EVERY MORNING
Qty: 90 TABLET | Refills: 2 | Status: SHIPPED | OUTPATIENT
Start: 2022-03-22 | End: 2022-05-16

## 2022-03-22 RX ORDER — LOSARTAN POTASSIUM 50 MG/1
50 TABLET ORAL 2 TIMES DAILY
Qty: 180 TABLET | Refills: 1 | Status: SHIPPED | OUTPATIENT
Start: 2022-03-22 | End: 2022-09-23 | Stop reason: SDUPTHER

## 2022-03-22 RX ORDER — SIMVASTATIN 20 MG
20 TABLET ORAL NIGHTLY
Qty: 90 TABLET | Refills: 3 | Status: SHIPPED | OUTPATIENT
Start: 2022-03-22 | End: 2022-09-23 | Stop reason: SDUPTHER

## 2022-03-22 RX ORDER — CITALOPRAM 40 MG/1
40 TABLET ORAL NIGHTLY
Qty: 90 TABLET | Refills: 3 | Status: SHIPPED | OUTPATIENT
Start: 2022-03-22 | End: 2022-09-23 | Stop reason: SDUPTHER

## 2022-03-22 NOTE — PROGRESS NOTES
The ABCs of the Annual Wellness Visit  Initial Medicare Wellness Visit    Chief Complaint   Patient presents with   • Medicare Wellness-Initial Visit     Patient is here today to establish care.  She is here for chronic health conditions:    Hypertension.  The patient states that she was diagnosed approximately in the 1990s.  She takes losartan 50 mg twice daily.  Blood pressures have been well controlled.    Hyperlipidemia.  The patient was diagnosed shortly after she was diagnosed with hypertension and she has been on cholesterol medicine for many years.  Currently she takes simvastatin 20 mg daily.  She denies any side effects.    Depression.  Patient takes Celexa 40 mg daily.  Patient is doing well.  Would like to continue it.  No SI/HI    Oxygen dependent COPD.  Patient is still smoking but recently cut back.  She is seeing pulmonology, Dr Rufino Gordillo, and currently she is taking Trelegy and albuterol as needed.    Stress urinary incontinence.  The patient used to take Vesicare which did help her symptoms but she has been out of that for a while.  She has not tried performing Kegel maneuvers to help with her symptoms.    Subjective   History of Present Illness:  María Colón is a 62 y.o. female who presents for an Initial Medicare Wellness Visit.    The following portions of the patient's history were reviewed and   updated as appropriate: allergies, current medications, past family history, past medical history, past social history, past surgical history and problem list.     Compared to one year ago, the patient feels her physical   health is the same.    Compared to one year ago, the patient feels her mental   health is the same.    Recent Hospitalizations:  She was not admitted to the hospital during the last year.       Current Medical Providers:  Patient Care Team:  Asuncion Pinto DO as PCP - General (Family Medicine)  Fatou Gonzalez APRN as Nurse Practitioner (Oncology)    Outpatient  Medications Prior to Visit   Medication Sig Dispense Refill   • Acetaminophen (TYLENOL ARTHRITIS PAIN PO) Take 2 capsules by mouth Every 6 (Six) Hours As Needed. Unsure of dose     • albuterol sulfate  (90 Base) MCG/ACT inhaler Inhale 2 puffs every 4 (four) hours as needed for wheezing.     • celecoxib (CeleBREX) 200 MG capsule Take 200 mg by mouth 2 (Two) Times a Day. PT TO STOP PER MD INSTRUCTION     • Cholecalciferol (VITAMIN D) 1000 units tablet Take 1,000 Units by mouth Daily As Needed.     • Fluticasone-Umeclidin-Vilant (TRELEGY) 100-62.5-25 MCG/INH inhaler Inhale 1 puff Daily.     • Loratadine 10 MG capsule Take 10 mg by mouth Daily As Needed.     • Misc Natural Products (GLUCOSAMINE CHONDROITIN MSM PO) Take 1 capsule by mouth 2 (Two) Times a Day. Unsure of dosage; PT TO STOP PER MD INSTRUCTION     • Multiple Vitamins-Minerals (MULTIVITAMIN ADULT PO) Take 1 tablet by mouth Every Morning. Unsure of last dose     • vitamin E 1000 UNIT capsule Take 1,000 Units by mouth 2 (Two) Times a Day.     • citalopram (CeleXA) 40 MG tablet Take 1 tablet by mouth Every Night. 90 tablet 1   • fluticasone (FLONASE) 50 MCG/ACT nasal spray 1 spray into the nostril(s) as directed by provider Daily As Needed for Rhinitis or Allergies. 16 g 3   • losartan (COZAAR) 50 MG tablet Take 1 tablet by mouth 2 (Two) Times a Day. 180 tablet 0   • pantoprazole (PROTONIX) 40 MG EC tablet Take 1 tablet by mouth Daily. 90 tablet 1   • simvastatin (ZOCOR) 20 MG tablet Take 1 tablet by mouth Every Night. 90 tablet 1   • solifenacin (VESICARE) 10 MG tablet Take 1 tablet by mouth Every Morning. 90 tablet 0     No facility-administered medications prior to visit.       No opioid medication identified on active medication list. I have reviewed chart for other potential  high risk medication/s and harmful drug interactions in the elderly.          Aspirin is not on active medication list.  Aspirin use is not indicated based on review of current  "medical condition/s. Risk of harm outweighs potential benefits.  .    Patient Active Problem List   Diagnosis   • Cellulitis of left upper extremity   • Hypertension   • Rheumatoid arthritis (HCC)   • Olecranon bursitis of left elbow   • Hyperlipidemia   • Oxygen dependent     Advance Care Planning  Advance Directive is not on file.  ACP discussion was held with the patient during this visit. Patient does not have an advance directive, information provided.          Objective       Vitals:    03/22/22 1056   BP: 142/82   Pulse: 100   Temp: 97.1 °F (36.2 °C)   SpO2: 94%   Weight: 96.3 kg (212 lb 6.4 oz)   Height: 160 cm (63\")     BMI Readings from Last 1 Encounters:   03/22/22 37.62 kg/m²   BMI is above normal parameters. Recommendations include: educational material, exercise counseling and nutrition counseling    Does the patient have evidence of cognitive impairment? No    Physical Exam  Vitals and nursing note reviewed.   Constitutional:       Appearance: Normal appearance. She is well-developed. She is obese.   HENT:      Head: Normocephalic and atraumatic.      Right Ear: External ear normal.      Left Ear: External ear normal.      Nose: Nose normal.   Eyes:      General: No scleral icterus.     Conjunctiva/sclera: Conjunctivae normal.   Cardiovascular:      Rate and Rhythm: Normal rate and regular rhythm.      Heart sounds: Normal heart sounds.   Pulmonary:      Effort: Pulmonary effort is normal.      Breath sounds: Normal breath sounds.   Genitourinary:     General: Normal vulva.      Vagina: Normal.      Uterus: Absent.       Comments: Cervix and uterus are surgically absent.  Musculoskeletal:      Cervical back: Normal range of motion and neck supple.      Right lower leg: No edema.      Left lower leg: No edema.   Lymphadenopathy:      Cervical: No cervical adenopathy.   Skin:     General: Skin is warm and dry.      Findings: No rash.   Neurological:      Mental Status: She is alert and oriented to " person, place, and time.   Psychiatric:         Mood and Affect: Mood normal.         Behavior: Behavior normal.         Thought Content: Thought content normal.         Judgment: Judgment normal.               HEALTH RISK ASSESSMENT    Smoking Status:  Social History     Tobacco Use   Smoking Status Current Every Day Smoker   • Packs/day: 0.50   • Years: 40.00   • Pack years: 20.00   • Types: Cigarettes   Smokeless Tobacco Never Used   Tobacco Comment    Began smoking at age 17.  Smoked 1 ppd for 31 years and .5 ppd for 10 years for a 36 pack year history. did quit  dec 2017 and then started back  May 2018       Alcohol Consumption:  Social History     Substance and Sexual Activity   Alcohol Use Not Currently    Comment: RARE USE     Fall Risk Screen:    STEADI Fall Risk Assessment has not been completed.    Depression Screen:   PHQ-2/PHQ-9 Depression Screening 3/22/2022   Little Interest or Pleasure in Doing Things 0-->not at all   Feeling Down, Depressed or Hopeless 0-->not at all   PHQ-9: Brief Depression Severity Measure Score 0       Health Habits and Functional and Cognitive Screening:  Functional & Cognitive Status 3/22/2022   Do you have difficulty preparing food and eating? No   Do you have difficulty bathing yourself, getting dressed or grooming yourself? No   Do you have difficulty using the toilet? No   Do you have difficulty moving around from place to place? No   Do you have trouble with steps or getting out of a bed or a chair? No   Current Diet Low Carb Diet   Dental Exam Not up to date   Eye Exam Other   Exercise (times per week) 0 times per week   Current Exercises Include No Regular Exercise   Do you need help using the phone?  No   Are you deaf or do you have serious difficulty hearing?  No   Do you need help with transportation? Yes   Do you need help shopping? Yes   Do you need help preparing meals?  No   Do you need help with housework?  No   Do you need help with laundry? No   Do you need  help taking your medications? No   Do you need help managing money? No   Do you ever drive or ride in a car without wearing a seat belt? No       Age-appropriate Screening Schedule:  Refer to the list below for future screening recommendations based on patient's age, sex and/or medical conditions. Orders for these recommended tests are listed in the plan section. The patient has been provided with a written plan.    Health Maintenance   Topic Date Due   • DXA SCAN  Never done   • TDAP/TD VACCINES (1 - Tdap) Never done   • ZOSTER VACCINE (1 of 2) Never done   • PAP SMEAR  Never done   • MAMMOGRAM  Never done   • INFLUENZA VACCINE  08/01/2021   • LIPID PANEL  12/13/2022            Assessment/Plan   CMS Preventative Services Quick Reference  Risk Factors Identified During Encounter  Immunizations Discussed/Encouraged (specific Immunizations; Pneumococcal 23, Shingrix and COVID19  Inactivity/Sedentary  Obesity/Overweight   The above risks/problems have been discussed with the patient.  Follow up actions/plans if indicated are seen below in the Assessment/Plan Section.  Pertinent information has been shared with the patient in the After Visit Summary.    Diagnoses and all orders for this visit:    1. Encounter for initial annual wellness visit (AWV) in Medicare patient (Primary)    2. Encounter for screening mammogram for malignant neoplasm of breast  -     Mammo Screening Digital Tomosynthesis Bilateral With CAD; Future    3. Encounter for screening for osteoporosis  -     DEXA Bone Density Axial; Future    4. Menopause  -     DEXA Bone Density Axial; Future    5. Primary hypertension  -     Comprehensive Metabolic Panel  -     losartan (COZAAR) 50 MG tablet; Take 1 tablet by mouth 2 (Two) Times a Day.  Dispense: 180 tablet; Refill: 1    6. Mixed hyperlipidemia  -     simvastatin (ZOCOR) 20 MG tablet; Take 1 tablet by mouth Every Night.  Dispense: 90 tablet; Refill: 3    7. Gastroesophageal reflux disease, unspecified  whether esophagitis present  -     pantoprazole (PROTONIX) 40 MG EC tablet; Take 1 tablet by mouth Daily.  Dispense: 90 tablet; Refill: 2    8. Chronic obstructive pulmonary disease, unspecified COPD type (HCC)    9. Encounter for long-term (current) use of medications  -     CBC & Differential  -     Comprehensive Metabolic Panel    10. Urinary, incontinence, stress female  -     solifenacin (VESICARE) 10 MG tablet; Take 1 tablet by mouth Every Morning.  Dispense: 90 tablet; Refill: 2    11. Acute adjustment disorder with mixed anxiety and depressed mood  -     citalopram (CeleXA) 40 MG tablet; Take 1 tablet by mouth Every Night.  Dispense: 90 tablet; Refill: 3    12. Seasonal allergic rhinitis due to pollen  -     fluticasone (FLONASE) 50 MCG/ACT nasal spray; 1 spray into the nostril(s) as directed by provider Daily As Needed for Rhinitis or Allergies.  Dispense: 16 g; Refill: 3    13. Polyp of colon, unspecified part of colon, unspecified type  -     Ambulatory Referral to General Surgery    14. Screening for malignant neoplasm of colon  -     Ambulatory Referral to General Surgery    Patient is here today for follow-up of chronic stable hypertension, hyperlipidemia, GERD, COPD, and incontinence.  Surveillance labs were obtained today and any medication changes will be made based on lab results and will be called to the patient later this week.    Routine health maintenance.  The patient is due for mammogram, DEXA scan, and colonoscopy.  Orders were entered.    Follow Up:  Return in about 6 months (around 9/22/2022) for HTN, Lipid.     An After Visit Summary and PPPS were made available to the patient.

## 2022-03-23 LAB
ALBUMIN SERPL-MCNC: 4.3 G/DL (ref 3.8–4.8)
ALBUMIN/GLOB SERPL: 1.6 {RATIO} (ref 1.2–2.2)
ALP SERPL-CCNC: 91 IU/L (ref 44–121)
ALT SERPL-CCNC: 25 IU/L (ref 0–32)
AST SERPL-CCNC: 20 IU/L (ref 0–40)
BASOPHILS # BLD AUTO: 0.1 X10E3/UL (ref 0–0.2)
BASOPHILS NFR BLD AUTO: 1 %
BILIRUB SERPL-MCNC: 0.3 MG/DL (ref 0–1.2)
BUN SERPL-MCNC: 17 MG/DL (ref 8–27)
BUN/CREAT SERPL: 27 (ref 12–28)
CALCIUM SERPL-MCNC: 9.5 MG/DL (ref 8.7–10.3)
CHLORIDE SERPL-SCNC: 102 MMOL/L (ref 96–106)
CO2 SERPL-SCNC: 20 MMOL/L (ref 20–29)
CREAT SERPL-MCNC: 0.62 MG/DL (ref 0.57–1)
EGFRCR SERPLBLD CKD-EPI 2021: 101 ML/MIN/1.73
EOSINOPHIL # BLD AUTO: 0.2 X10E3/UL (ref 0–0.4)
EOSINOPHIL NFR BLD AUTO: 2 %
ERYTHROCYTE [DISTWIDTH] IN BLOOD BY AUTOMATED COUNT: 12.1 % (ref 11.7–15.4)
GLOBULIN SER CALC-MCNC: 2.7 G/DL (ref 1.5–4.5)
GLUCOSE SERPL-MCNC: 93 MG/DL (ref 65–99)
HCT VFR BLD AUTO: 45.9 % (ref 34–46.6)
HGB BLD-MCNC: 15.6 G/DL (ref 11.1–15.9)
IMM GRANULOCYTES # BLD AUTO: 0.1 X10E3/UL (ref 0–0.1)
IMM GRANULOCYTES NFR BLD AUTO: 1 %
LYMPHOCYTES # BLD AUTO: 1.7 X10E3/UL (ref 0.7–3.1)
LYMPHOCYTES NFR BLD AUTO: 22 %
MCH RBC QN AUTO: 31.5 PG (ref 26.6–33)
MCHC RBC AUTO-ENTMCNC: 34 G/DL (ref 31.5–35.7)
MCV RBC AUTO: 93 FL (ref 79–97)
MONOCYTES # BLD AUTO: 0.5 X10E3/UL (ref 0.1–0.9)
MONOCYTES NFR BLD AUTO: 7 %
NEUTROPHILS # BLD AUTO: 5.3 X10E3/UL (ref 1.4–7)
NEUTROPHILS NFR BLD AUTO: 67 %
PLATELET # BLD AUTO: 294 X10E3/UL (ref 150–450)
POTASSIUM SERPL-SCNC: 4.4 MMOL/L (ref 3.5–5.2)
PROT SERPL-MCNC: 7 G/DL (ref 6–8.5)
RBC # BLD AUTO: 4.96 X10E6/UL (ref 3.77–5.28)
SODIUM SERPL-SCNC: 141 MMOL/L (ref 134–144)
WBC # BLD AUTO: 7.8 X10E3/UL (ref 3.4–10.8)

## 2022-04-20 ENCOUNTER — TELEPHONE (OUTPATIENT)
Dept: FAMILY MEDICINE CLINIC | Facility: CLINIC | Age: 62
End: 2022-04-20

## 2022-04-20 NOTE — TELEPHONE ENCOUNTER
Caller: María Colón    Relationship: Self    Best call back number:[phone]    What specialty or service is being requested: COLONOSCOPY     What is the provider, practice or medical service name: [facility]    Any additional details: WANTS TO GO TO THE DOCTOR THAT DID HER COLONOSCOPY LAST TIME

## 2022-04-20 NOTE — TELEPHONE ENCOUNTER
Provider: HENRI GRULLON    Caller: JIM SYLVESTER    Relationship to Patient: SELF    Phone Number: [phone]    Reason for Call: PATIENT WOULD LIKE FOR THE OFFICE TO DISREGARD THE MESSAGE ASKING FOR ANOTHER REFERRAL TO THE DR THAT DID HER FIRST COLONOSCOPY.     PLEASE CALL PATIENT WITH ANY QUESTIONS OR CONCERNS

## 2022-04-20 NOTE — TELEPHONE ENCOUNTER
Spoke with pt and she said to disregard both messages she will go to the appt set for this Friday that you ordered

## 2022-04-20 NOTE — TELEPHONE ENCOUNTER
Please clarify her message.  I was sending her for repeat colonoscopy for colon cancer screening.  Is she refusing colon cancer screening or has she scheduled an appointment on her own?

## 2022-04-22 ENCOUNTER — OFFICE VISIT (OUTPATIENT)
Dept: SURGERY | Facility: CLINIC | Age: 62
End: 2022-04-22

## 2022-04-22 VITALS
SYSTOLIC BLOOD PRESSURE: 132 MMHG | WEIGHT: 212 LBS | RESPIRATION RATE: 20 BRPM | BODY MASS INDEX: 37.56 KG/M2 | HEART RATE: 72 BPM | DIASTOLIC BLOOD PRESSURE: 80 MMHG | HEIGHT: 63 IN

## 2022-04-22 DIAGNOSIS — Z72.0 TOBACCO USE: ICD-10-CM

## 2022-04-22 DIAGNOSIS — Z86.010 HISTORY OF COLON POLYPS: Primary | ICD-10-CM

## 2022-04-22 PROBLEM — Z86.0100 HISTORY OF COLON POLYPS: Status: ACTIVE | Noted: 2022-04-22

## 2022-04-22 PROCEDURE — S0260 H&P FOR SURGERY: HCPCS | Performed by: SURGERY

## 2022-04-22 RX ORDER — POLYETHYLENE GLYCOL 3350, SODIUM SULFATE ANHYDROUS, SODIUM BICARBONATE, SODIUM CHLORIDE, POTASSIUM CHLORIDE 236; 22.74; 6.74; 5.86; 2.97 G/4L; G/4L; G/4L; G/4L; G/4L
4 POWDER, FOR SOLUTION ORAL ONCE
Qty: 1 ML | Refills: 0 | Status: SHIPPED | OUTPATIENT
Start: 2022-04-22 | End: 2022-04-22

## 2022-04-22 NOTE — PROGRESS NOTES
María DILLON Katarzyna 62 y.o. female presents @ the req of Dr. Pinto for eval of c-scope. + pers H/O colon polyps.   Chief Complaint   Patient presents with   • Colonoscopy             HPI   Above-noted agree.  This very pleasant 62-year-old female has a history of colon polyps.  Her last colonoscopy 5 years ago.  She is due for a repeat colonoscopy.  She is tolerating a regular diet without nausea or vomiting.  She was her bowels well without blood.  She has no chest pain.  She has shortness of breath.  She does smoke cigarettes.  She has no other complaints.      Review of Systems   All other systems reviewed and are negative.            Current Outpatient Medications:   •  Acetaminophen (TYLENOL ARTHRITIS PAIN PO), Take 2 capsules by mouth Every 6 (Six) Hours As Needed. Unsure of dose, Disp: , Rfl:   •  albuterol sulfate  (90 Base) MCG/ACT inhaler, Inhale 2 puffs every 4 (four) hours as needed for wheezing., Disp: , Rfl:   •  celecoxib (CeleBREX) 200 MG capsule, Take 200 mg by mouth 2 (Two) Times a Day. PT TO STOP PER MD INSTRUCTION, Disp: , Rfl:   •  Cholecalciferol (VITAMIN D) 1000 units tablet, Take 1,000 Units by mouth Daily As Needed., Disp: , Rfl:   •  citalopram (CeleXA) 40 MG tablet, Take 1 tablet by mouth Every Night., Disp: 90 tablet, Rfl: 3  •  fluticasone (FLONASE) 50 MCG/ACT nasal spray, 1 spray into the nostril(s) as directed by provider Daily As Needed for Rhinitis or Allergies., Disp: 16 g, Rfl: 3  •  Fluticasone-Umeclidin-Vilant (TRELEGY) 100-62.5-25 MCG/INH inhaler, Inhale 1 puff Daily., Disp: , Rfl:   •  Loratadine 10 MG capsule, Take 10 mg by mouth Daily As Needed., Disp: , Rfl:   •  losartan (COZAAR) 50 MG tablet, Take 1 tablet by mouth 2 (Two) Times a Day., Disp: 180 tablet, Rfl: 1  •  Misc Natural Products (GLUCOSAMINE CHONDROITIN MSM PO), Take 1 capsule by mouth 2 (Two) Times a Day. Unsure of dosage; PT TO STOP PER MD INSTRUCTION, Disp: , Rfl:   •  Multiple Vitamins-Minerals  (MULTIVITAMIN ADULT PO), Take 1 tablet by mouth Every Morning. Unsure of last dose, Disp: , Rfl:   •  pantoprazole (PROTONIX) 40 MG EC tablet, Take 1 tablet by mouth Daily., Disp: 90 tablet, Rfl: 2  •  simvastatin (ZOCOR) 20 MG tablet, Take 1 tablet by mouth Every Night., Disp: 90 tablet, Rfl: 3  •  solifenacin (VESICARE) 10 MG tablet, Take 1 tablet by mouth Every Morning., Disp: 90 tablet, Rfl: 2  •  vitamin E 1000 UNIT capsule, Take 1,000 Units by mouth 2 (Two) Times a Day., Disp: , Rfl:         Allergies   Allergen Reactions   • Lisinopril Cough   • Nickel Itching           Past Medical History:   Diagnosis Date   • Allergic rhinitis    • Anxiety    • Arthritis    • At risk for obstructive sleep apnea     score 5   • Autoimmune disorder (HCC)    • Cervicalgia    • COPD (chronic obstructive pulmonary disease) (HCC)    • Depression    • Elbow pain, chronic, left    • Elevated cholesterol    • GERD (gastroesophageal reflux disease)    • History of medical problems COPD   • Hyperlipidemia    • Hypertension    • Open wound     daily washiing with dial soap and applying medical honey and 1/4 str dakins and cover dsd   • Osteopenia    • Oxygen dependent     O2  2l/min nasal cannula nightly   • Staph infection     left arm   • Urinary incontinence     wears pads           Past Surgical History:   Procedure Laterality Date   • BREAST AUGMENTATION     • CARPAL TUNNEL RELEASE Right    • CERVICAL DISC SURGERY      x2   • COLONOSCOPY     • CREATE / REPLACE / REMOVE CSF SHUNT     • EXCISION MASS ARM Left 06/15/2018    Procedure: DEBRIDEMENT OF OLECRANON  ULCER, RECONSTRUCTION LEFT FOREARM FLAP;  Surgeon: Herson Godfrey MD;  Location: [facility];  Service: Plastics   • EYE SURGERY  Cataracts    2010   • HYSTERECTOMY     • INCISION AND DRAINAGE ARM Left 06/01/2016    Procedure: PROCEDURE NOT FOUND LT ELBOW I&D;  Surgeon: Colby Arboleda MD;  Location: [facility] OR;  Service:    • INCISION AND DRAINAGE ARM Left  "08/10/2018    Procedure: Left  soft tissue debridment, closure and placement of wound vacuum;  Surgeon: Herson Godfrey MD;  Location: [facility];  Service: Plastics   • LUMBAR DECOMPRESSION     • TONSILLECTOMY     • TUBAL ABDOMINAL LIGATION  1988           Social History     Tobacco Use   • Smoking status: Current Every Day Smoker     Packs/day: 0.50     Years: 40.00     Pack years: 20.00     Types: Cigarettes   • Smokeless tobacco: Never Used   • Tobacco comment: Began smoking at age 17.  Smoked 1 ppd for 31 years and .5 ppd for 10 years for a 36 pack year history. did quit  dec 2017 and then started back  May 2018     Substance Use Topics   • Alcohol use: Not Currently     Comment: RARE USE   • Drug use: No           Immunization History   Administered Date(s) Administered   • Pneumococcal, Unspecified 03/24/2011           Physical Exam  Vitals and nursing note reviewed.   Constitutional:       Appearance: Normal appearance.   HENT:      Head: Normocephalic and atraumatic.   Cardiovascular:      Rate and Rhythm: Normal rate and regular rhythm.   Pulmonary:      Effort: Pulmonary effort is normal.      Breath sounds: Normal breath sounds.   Abdominal:      General: Bowel sounds are normal.      Palpations: Abdomen is soft.   Musculoskeletal:         General: No swelling or tenderness.   Skin:     General: Skin is warm and dry.   Neurological:      General: No focal deficit present.      Mental Status: She is alert and oriented to person, place, and time.   Psychiatric:         Mood and Affect: Mood normal.         Behavior: Behavior normal.         Debilities/Disabilities Identified: None    Emotional Behavior: Appropriate      /80   Pulse 72   Resp 20   Ht 160 cm (63\")   Wt 96.2 kg (212 lb)   BMI 37.55 kg/m²         Diagnoses and all orders for this visit:    1. History of colon polyps (Primary)    I discussed with the patient the benefits and risks of performing endoscopy.  Benefits and " risks were not limited to but including bleeding, infection, perforation, complications of anesthesia, aspiration.  The patient appeared to understand and is willing to proceed.    Thank you for allowing me to participate in the care of this interesting patient.

## 2022-04-22 NOTE — H&P
María DILLON Katarzyna 62 y.o. female presents @ the req of Dr. Pinto for eval of c-scope. + pers H/O colon polyps.   Chief Complaint   Patient presents with   • Colonoscopy             HPI   Above-noted agree.  This very pleasant 62-year-old female has a history of colon polyps.  Her last colonoscopy 5 years ago.  She is due for a repeat colonoscopy.  She is tolerating a regular diet without nausea or vomiting.  She was her bowels well without blood.  She has no chest pain.  She has shortness of breath.  She does smoke cigarettes.  She has no other complaints.      Review of Systems   All other systems reviewed and are negative.            Current Outpatient Medications:   •  Acetaminophen (TYLENOL ARTHRITIS PAIN PO), Take 2 capsules by mouth Every 6 (Six) Hours As Needed. Unsure of dose, Disp: , Rfl:   •  albuterol sulfate  (90 Base) MCG/ACT inhaler, Inhale 2 puffs every 4 (four) hours as needed for wheezing., Disp: , Rfl:   •  celecoxib (CeleBREX) 200 MG capsule, Take 200 mg by mouth 2 (Two) Times a Day. PT TO STOP PER MD INSTRUCTION, Disp: , Rfl:   •  Cholecalciferol (VITAMIN D) 1000 units tablet, Take 1,000 Units by mouth Daily As Needed., Disp: , Rfl:   •  citalopram (CeleXA) 40 MG tablet, Take 1 tablet by mouth Every Night., Disp: 90 tablet, Rfl: 3  •  fluticasone (FLONASE) 50 MCG/ACT nasal spray, 1 spray into the nostril(s) as directed by provider Daily As Needed for Rhinitis or Allergies., Disp: 16 g, Rfl: 3  •  Fluticasone-Umeclidin-Vilant (TRELEGY) 100-62.5-25 MCG/INH inhaler, Inhale 1 puff Daily., Disp: , Rfl:   •  Loratadine 10 MG capsule, Take 10 mg by mouth Daily As Needed., Disp: , Rfl:   •  losartan (COZAAR) 50 MG tablet, Take 1 tablet by mouth 2 (Two) Times a Day., Disp: 180 tablet, Rfl: 1  •  Misc Natural Products (GLUCOSAMINE CHONDROITIN MSM PO), Take 1 capsule by mouth 2 (Two) Times a Day. Unsure of dosage; PT TO STOP PER MD INSTRUCTION, Disp: , Rfl:   •  Multiple Vitamins-Minerals  (MULTIVITAMIN ADULT PO), Take 1 tablet by mouth Every Morning. Unsure of last dose, Disp: , Rfl:   •  pantoprazole (PROTONIX) 40 MG EC tablet, Take 1 tablet by mouth Daily., Disp: 90 tablet, Rfl: 2  •  simvastatin (ZOCOR) 20 MG tablet, Take 1 tablet by mouth Every Night., Disp: 90 tablet, Rfl: 3  •  solifenacin (VESICARE) 10 MG tablet, Take 1 tablet by mouth Every Morning., Disp: 90 tablet, Rfl: 2  •  vitamin E 1000 UNIT capsule, Take 1,000 Units by mouth 2 (Two) Times a Day., Disp: , Rfl:         Allergies   Allergen Reactions   • Lisinopril Cough   • Nickel Itching           Past Medical History:   Diagnosis Date   • Allergic rhinitis    • Anxiety    • Arthritis    • At risk for obstructive sleep apnea     score 5   • Autoimmune disorder (HCC)    • Cervicalgia    • COPD (chronic obstructive pulmonary disease) (HCC)    • Depression    • Elbow pain, chronic, left    • Elevated cholesterol    • GERD (gastroesophageal reflux disease)    • History of medical problems COPD   • Hyperlipidemia    • Hypertension    • Open wound     daily washiing with dial soap and applying medical honey and 1/4 str dakins and cover dsd   • Osteopenia    • Oxygen dependent     O2  2l/min nasal cannula nightly   • Staph infection     left arm   • Urinary incontinence     wears pads           Past Surgical History:   Procedure Laterality Date   • BREAST AUGMENTATION     • CARPAL TUNNEL RELEASE Right    • CERVICAL DISC SURGERY      x2   • COLONOSCOPY     • CREATE / REPLACE / REMOVE CSF SHUNT     • EXCISION MASS ARM Left 06/15/2018    Procedure: DEBRIDEMENT OF OLECRANON  ULCER, RECONSTRUCTION LEFT FOREARM FLAP;  Surgeon: Herson Godfrey MD;  Location: [facility], [place];  Service: Plastics   • EYE SURGERY  Cataracts    2010   • HYSTERECTOMY     • INCISION AND DRAINAGE ARM Left 06/01/2016    Procedure: PROCEDURE NOT FOUND LT ELBOW I&D;  Surgeon: Colby Arboleda MD;  Location: [facility] OR;  Service:    • INCISION AND DRAINAGE ARM Left  "08/10/2018    Procedure: Left  soft tissue debridment, closure and placement of wound vacuum;  Surgeon: Herson Godfrey MD;  Location: [facility];  Service: Plastics   • LUMBAR DECOMPRESSION     • TONSILLECTOMY     • TUBAL ABDOMINAL LIGATION  1988           Social History     Tobacco Use   • Smoking status: Current Every Day Smoker     Packs/day: 0.50     Years: 40.00     Pack years: 20.00     Types: Cigarettes   • Smokeless tobacco: Never Used   • Tobacco comment: Began smoking at age 17.  Smoked 1 ppd for 31 years and .5 ppd for 10 years for a 36 pack year history. did quit  dec 2017 and then started back  May 2018     Substance Use Topics   • Alcohol use: Not Currently     Comment: RARE USE   • Drug use: No           Immunization History   Administered Date(s) Administered   • Pneumococcal, Unspecified 03/24/2011           Physical Exam  Vitals and nursing note reviewed.   Constitutional:       Appearance: Normal appearance.   HENT:      Head: Normocephalic and atraumatic.   Cardiovascular:      Rate and Rhythm: Normal rate and regular rhythm.   Pulmonary:      Effort: Pulmonary effort is normal.      Breath sounds: Normal breath sounds.   Abdominal:      General: Bowel sounds are normal.      Palpations: Abdomen is soft.   Musculoskeletal:         General: No swelling or tenderness.   Skin:     General: Skin is warm and dry.   Neurological:      General: No focal deficit present.      Mental Status: She is alert and oriented to person, place, and time.   Psychiatric:         Mood and Affect: Mood normal.         Behavior: Behavior normal.         Debilities/Disabilities Identified: None    Emotional Behavior: Appropriate      /80   Pulse 72   Resp 20   Ht 160 cm (63\")   Wt 96.2 kg (212 lb)   BMI 37.55 kg/m²         Diagnoses and all orders for this visit:    1. History of colon polyps (Primary)    I discussed with the patient the benefits and risks of performing endoscopy.  Benefits and " risks were not limited to but including bleeding, infection, perforation, complications of anesthesia, aspiration.  The patient appeared to understand and is willing to proceed.    Thank you for allowing me to participate in the care of this interesting patient.

## 2022-04-22 NOTE — PATIENT INSTRUCTIONS
IF YOU SMOKE OR USE TOBACCO PLEASE READ THE FOLLOWING:  Why is smoking bad for me?  Smoking increases the risk of heart disease, lung disease, vascular disease, stroke, and cancer. If you smoke, STOP!    For more information:  Quit Now [place]  1-800-QUIT-NOW  https://maxwelly.quitlogix.org/en-US/    Steps to Quit Smoking  Smoking tobacco is the leading cause of preventable death. It can affect almost every organ in the body. Smoking puts you and those around you at risk for developing many serious chronic diseases. Quitting smoking can be difficult, but it is one of the best things that you can do for your health. It is never too late to quit.  How do I get ready to quit?  When you decide to quit smoking, create a plan to help you succeed. Before you quit:  · Pick a date to quit. Set a date within the next 2 weeks to give you time to prepare.  · Write down the reasons why you are quitting. Keep this list in places where you will see it often.  · Tell your family, friends, and co-workers that you are quitting. Support from your loved ones can make quitting easier.  · Talk with your health care provider about your options for quitting smoking.  · Find out what treatment options are covered by your health insurance.  · Identify people, places, things, and activities that make you want to smoke (triggers). Avoid them.  What first steps can I take to quit smoking?  · Throw away all cigarettes at home, at work, and in your car.  · Throw away smoking accessories, such as ashtrays and lighters.  · Clean your car. Make sure to empty the ashtray.  · Clean your home, including curtains and carpets.  What strategies can I use to quit smoking?  Talk with your health care provider about combining strategies, such as taking medicines while you are also receiving in-person counseling. Using these two strategies together makes you more likely to succeed in quitting than if you used either strategy on its own.  · If you are  pregnant or breastfeeding, talk with your health care provider about finding counseling or other support strategies to quit smoking. Do not take medicine to help you quit smoking unless your health care provider tells you to do so.  To quit smoking:  Quit right away  · Quit smoking completely, instead of gradually reducing how much you smoke over a period of time. Research shows that stopping smoking right away is more successful than gradually quitting.  · Attend in-person counseling to help you build problem-solving skills. You are more likely to succeed in quitting if you attend counseling sessions regularly. Even short sessions of 10 minutes can be effective.  Take medicine  You may take medicines to help you quit smoking. Some medicines require a prescription and some you can purchase over-the-counter. Medicines may have nicotine in them to replace the nicotine in cigarettes. Medicines may:  · Help to stop cravings.  · Help to relieve withdrawal symptoms.  Your health care provider may recommend:  · Nicotine patches, gum, or lozenges.  · Nicotine inhalers or sprays.  · Non-nicotine medicine that is taken by mouth.  Find resources  Find resources and support systems that can help you to quit smoking and remain smoke-free after you quit. These resources are most helpful when you use them often. They include:  · Online chats with a counselor.  · Telephone quitlines.  · Printed self-help materials.  · Support groups or group counseling.  · Text messaging programs.  · Mobile phone apps or applications. Use apps that can help you stick to your quit plan by providing reminders, tips, and encouragement. There are many free apps for mobile devices as well as websites. Examples include Quit Guide from the CDC and smokefree.gov  What things can I do to make it easier to quit?    · Reach out to your family and friends for support and encouragement. Call telephone quitlines (5-800-QUIT-NOW), reach out to support groups, or  work with a counselor for support.  · Ask people who smoke to avoid smoking around you.  · Avoid places that trigger you to smoke, such as bars, parties, or smoke-break areas at work.  · Spend time with people who do not smoke.  · Lessen the stress in your life. Stress can be a smoking trigger for some people. To lessen stress, try:  ? Exercising regularly.  ? Doing deep-breathing exercises.  ? Doing yoga.  ? Meditating.  ? Performing a body scan. This involves closing your eyes, scanning your body from head to toe, and noticing which parts of your body are particularly tense. Try to relax the muscles in those areas.  How will I feel when I quit smoking?  Day 1 to 3 weeks  Within the first 24 hours of quitting smoking, you may start to feel withdrawal symptoms. These symptoms are usually most noticeable 2-3 days after quitting, but they usually do not last for more than 2-3 weeks. You may experience these symptoms:  · Mood swings.  · Restlessness, anxiety, or irritability.  · Trouble concentrating.  · Dizziness.  · Strong cravings for sugary foods and nicotine.  · Mild weight gain.  · Constipation.  · Nausea.  · Coughing or a sore throat.  · Changes in how the medicines that you take for unrelated issues work in your body.  · Depression.  · Trouble sleeping (insomnia).  Week 3 and afterward  After the first 2-3 weeks of quitting, you may start to notice more positive results, such as:  · Improved sense of smell and taste.  · Decreased coughing and sore throat.  · Slower heart rate.  · Lower blood pressure.  · Clearer skin.  · The ability to breathe more easily.  · Fewer sick days.  Quitting smoking can be very challenging. Do not get discouraged if you are not successful the first time. Some people need to make many attempts to quit before they achieve long-term success. Do your best to stick to your quit plan, and talk with your health care provider if you have any questions or concerns.  Summary  · Smoking tobacco  is the leading cause of preventable death. Quitting smoking is one of the best things that you can do for your health.  · When you decide to quit smoking, create a plan to help you succeed.  · Quit smoking right away, not slowly over a period of time.  · When you start quitting, seek help from your health care provider, family, or friends.  This information is not intended to replace advice given to you by your health care provider. Make sure you discuss any questions you have with your health care provider.  Document Revised: 09/11/2020 Document Reviewed: 03/07/2020  Elsevier Patient Education © 2021 [employer]vier Inc.

## 2022-05-02 ENCOUNTER — HOSPITAL ENCOUNTER (OUTPATIENT)
Dept: CT IMAGING | Facility: HOSPITAL | Age: 62
Discharge: HOME | End: 2022-05-02
Admitting: INTERNAL MEDICINE

## 2022-05-02 DIAGNOSIS — R91.1 LUNG NODULE: ICD-10-CM

## 2022-05-02 PROCEDURE — 71250 CT THORAX DX C-: CPT

## 2022-05-16 ENCOUNTER — TRANSCRIBE ORDERS (OUTPATIENT)
Dept: ADMINISTRATIVE | Facility: HOSPITAL | Age: 62
End: 2022-05-16

## 2022-05-16 ENCOUNTER — ANESTHESIA EVENT (OUTPATIENT)
Dept: PERIOP | Facility: HOSPITAL | Age: 62
End: 2022-05-16

## 2022-05-16 DIAGNOSIS — R91.1 LUNG NODULE: Primary | ICD-10-CM

## 2022-05-16 RX ORDER — BUDESONIDE AND FORMOTEROL FUMARATE DIHYDRATE 160; 4.5 UG/1; UG/1
2 AEROSOL RESPIRATORY (INHALATION)
COMMUNITY

## 2022-05-16 RX ORDER — BUPROPION HYDROCHLORIDE 100 MG/1
100 TABLET ORAL 2 TIMES DAILY
COMMUNITY
End: 2022-09-23

## 2022-05-16 RX ORDER — TIOTROPIUM BROMIDE INHALATION SPRAY 1.56 UG/1
2 SPRAY, METERED RESPIRATORY (INHALATION)
COMMUNITY

## 2022-05-17 ENCOUNTER — ANESTHESIA (OUTPATIENT)
Dept: PERIOP | Facility: HOSPITAL | Age: 62
End: 2022-05-17

## 2022-05-17 ENCOUNTER — HOSPITAL ENCOUNTER (OUTPATIENT)
Facility: HOSPITAL | Age: 62
Setting detail: HOSPITAL OUTPATIENT SURGERY
Discharge: HOME | End: 2022-05-17
Attending: SURGERY | Admitting: SURGERY

## 2022-05-17 VITALS
BODY MASS INDEX: 36.53 KG/M2 | WEIGHT: 206.2 LBS | RESPIRATION RATE: 18 BRPM | DIASTOLIC BLOOD PRESSURE: 83 MMHG | SYSTOLIC BLOOD PRESSURE: 137 MMHG | OXYGEN SATURATION: 95 % | TEMPERATURE: 97.9 F | HEART RATE: 67 BPM

## 2022-05-17 DIAGNOSIS — Z86.010 HISTORY OF COLON POLYPS: ICD-10-CM

## 2022-05-17 PROCEDURE — 88305 TISSUE EXAM BY PATHOLOGIST: CPT | Performed by: SURGERY

## 2022-05-17 PROCEDURE — 45380 COLONOSCOPY AND BIOPSY: CPT | Performed by: SURGERY

## 2022-05-17 PROCEDURE — 25010000002 PROPOFOL 10 MG/ML EMULSION: Performed by: NURSE ANESTHETIST, CERTIFIED REGISTERED

## 2022-05-17 RX ORDER — LIDOCAINE HYDROCHLORIDE 10 MG/ML
0.5 INJECTION, SOLUTION EPIDURAL; INFILTRATION; INTRACAUDAL; PERINEURAL ONCE AS NEEDED
Status: DISCONTINUED | OUTPATIENT
Start: 2022-05-17 | End: 2022-05-17 | Stop reason: HOSPADM

## 2022-05-17 RX ORDER — SODIUM CHLORIDE 0.9 % (FLUSH) 0.9 %
10 SYRINGE (ML) INJECTION EVERY 12 HOURS SCHEDULED
Status: DISCONTINUED | OUTPATIENT
Start: 2022-05-17 | End: 2022-05-17 | Stop reason: HOSPADM

## 2022-05-17 RX ORDER — LIDOCAINE HYDROCHLORIDE 20 MG/ML
INJECTION, SOLUTION INFILTRATION; PERINEURAL AS NEEDED
Status: DISCONTINUED | OUTPATIENT
Start: 2022-05-17 | End: 2022-05-17 | Stop reason: SURG

## 2022-05-17 RX ORDER — SODIUM CHLORIDE, SODIUM LACTATE, POTASSIUM CHLORIDE, CALCIUM CHLORIDE 600; 310; 30; 20 MG/100ML; MG/100ML; MG/100ML; MG/100ML
9 INJECTION, SOLUTION INTRAVENOUS CONTINUOUS PRN
Status: DISCONTINUED | OUTPATIENT
Start: 2022-05-17 | End: 2022-05-17 | Stop reason: HOSPADM

## 2022-05-17 RX ORDER — PROPOFOL 10 MG/ML
VIAL (ML) INTRAVENOUS AS NEEDED
Status: DISCONTINUED | OUTPATIENT
Start: 2022-05-17 | End: 2022-05-17 | Stop reason: SURG

## 2022-05-17 RX ORDER — SODIUM CHLORIDE, SODIUM LACTATE, POTASSIUM CHLORIDE, CALCIUM CHLORIDE 600; 310; 30; 20 MG/100ML; MG/100ML; MG/100ML; MG/100ML
100 INJECTION, SOLUTION INTRAVENOUS CONTINUOUS
Status: DISCONTINUED | OUTPATIENT
Start: 2022-05-17 | End: 2022-05-17 | Stop reason: HOSPADM

## 2022-05-17 RX ORDER — ONDANSETRON 2 MG/ML
4 INJECTION INTRAMUSCULAR; INTRAVENOUS ONCE AS NEEDED
Status: DISCONTINUED | OUTPATIENT
Start: 2022-05-17 | End: 2022-05-17 | Stop reason: HOSPADM

## 2022-05-17 RX ORDER — SODIUM CHLORIDE 9 MG/ML
40 INJECTION, SOLUTION INTRAVENOUS AS NEEDED
Status: DISCONTINUED | OUTPATIENT
Start: 2022-05-17 | End: 2022-05-17 | Stop reason: HOSPADM

## 2022-05-17 RX ORDER — SODIUM CHLORIDE 0.9 % (FLUSH) 0.9 %
10 SYRINGE (ML) INJECTION AS NEEDED
Status: DISCONTINUED | OUTPATIENT
Start: 2022-05-17 | End: 2022-05-17 | Stop reason: HOSPADM

## 2022-05-17 RX ADMIN — PROPOFOL 50 MG: 10 INJECTION, EMULSION INTRAVENOUS at 08:51

## 2022-05-17 RX ADMIN — PROPOFOL 50 MG: 10 INJECTION, EMULSION INTRAVENOUS at 09:03

## 2022-05-17 RX ADMIN — PROPOFOL 50 MG: 10 INJECTION, EMULSION INTRAVENOUS at 08:57

## 2022-05-17 RX ADMIN — PROPOFOL 50 MG: 10 INJECTION, EMULSION INTRAVENOUS at 08:36

## 2022-05-17 RX ADMIN — SODIUM CHLORIDE, POTASSIUM CHLORIDE, SODIUM LACTATE AND CALCIUM CHLORIDE 9 ML/HR: 600; 310; 30; 20 INJECTION, SOLUTION INTRAVENOUS at 07:47

## 2022-05-17 RX ADMIN — LIDOCAINE HYDROCHLORIDE 100 MG: 20 INJECTION, SOLUTION INFILTRATION; PERINEURAL at 08:31

## 2022-05-17 RX ADMIN — PROPOFOL 100 MG: 10 INJECTION, EMULSION INTRAVENOUS at 08:33

## 2022-05-17 RX ADMIN — PROPOFOL 50 MG: 10 INJECTION, EMULSION INTRAVENOUS at 08:39

## 2022-05-17 RX ADMIN — PROPOFOL 50 MG: 10 INJECTION, EMULSION INTRAVENOUS at 08:45

## 2022-05-17 RX ADMIN — PROPOFOL 50 MG: 10 INJECTION, EMULSION INTRAVENOUS at 09:09

## 2022-05-17 RX ADMIN — PROPOFOL 50 MG: 10 INJECTION, EMULSION INTRAVENOUS at 08:48

## 2022-05-17 RX ADMIN — PROPOFOL 50 MG: 10 INJECTION, EMULSION INTRAVENOUS at 08:42

## 2022-05-17 RX ADMIN — PROPOFOL 50 MG: 10 INJECTION, EMULSION INTRAVENOUS at 09:13

## 2022-05-17 NOTE — ANESTHESIA PREPROCEDURE EVALUATION
Anesthesia Evaluation     Patient summary reviewed and Nursing notes reviewed   no history of anesthetic complications:  NPO Solid Status: > 8 hours  NPO Liquid Status: > 8 hours           Airway   Mallampati: II  TM distance: >3 FB  Neck ROM: full  No difficulty expected  Dental    (+) upper dentures and lower dentures    Pulmonary - normal exam    breath sounds clear to auscultation  (+) a smoker Current Smoked day of surgery, COPD (Inhalers daily.), home oxygen (at night),   Cardiovascular - normal exam  Exercise tolerance: good (4-7 METS)    Rhythm: regular  Rate: normal    (+) hypertension, hyperlipidemia,       Neuro/Psych  (+) psychiatric history Anxiety and Depression,    GI/Hepatic/Renal/Endo    (+) obesity,  GERD well controlled,      Musculoskeletal     (+) back pain, neck pain,   Abdominal   (+) obese,    Substance History - negative use     OB/GYN negative ob/gyn ROS         Other   arthritis,                      Anesthesia Plan    ASA 2     MAC     intravenous induction     Anesthetic plan, all risks, benefits, and alternatives have been provided, discussed and informed consent has been obtained with: patient.  Use of blood products discussed with patient  Consented to blood products.       CODE STATUS:

## 2022-05-17 NOTE — ANESTHESIA POSTPROCEDURE EVALUATION
Patient: María Colón    Procedure Summary     Date: 05/17/22 Room / Location: [facility] ENDOSCOPY 1 /  [facility] OR    Anesthesia Start: 0829 Anesthesia Stop: 0916    Procedure: COLONOSCOPY with polypectomy (N/A ) Diagnosis:       History of colon polyps      (History of colon polyps [Z86.010])    Surgeons: Jeniffer Bennett DO Provider: Suni Page CRNA    Anesthesia Type: MAC ASA Status: 2          Anesthesia Type: MAC    Vitals  Vitals Value Taken Time   /83 05/17/22 0945   Temp 97.9 °F (36.6 °C) 05/17/22 0925   Pulse 67 05/17/22 0945   Resp 18 05/17/22 0945   SpO2 95 % 05/17/22 0945           Post Anesthesia Care and Evaluation    Patient location during evaluation: PHASE II  Patient participation: complete - patient participated  Level of consciousness: awake and alert  Pain score: 0  Pain management: adequate  Airway patency: patent  Anesthetic complications: No anesthetic complications  PONV Status: none  Cardiovascular status: acceptable  Respiratory status: acceptable  Hydration status: acceptable

## 2022-05-18 LAB
LAB AP CASE REPORT: NORMAL
PATH REPORT.FINAL DX SPEC: NORMAL
PATH REPORT.GROSS SPEC: NORMAL

## 2022-09-23 ENCOUNTER — OFFICE VISIT (OUTPATIENT)
Dept: FAMILY MEDICINE CLINIC | Facility: CLINIC | Age: 62
End: 2022-09-23

## 2022-09-23 VITALS
DIASTOLIC BLOOD PRESSURE: 70 MMHG | SYSTOLIC BLOOD PRESSURE: 126 MMHG | HEIGHT: 63 IN | HEART RATE: 80 BPM | WEIGHT: 207.2 LBS | TEMPERATURE: 98.1 F | OXYGEN SATURATION: 94 % | BODY MASS INDEX: 36.71 KG/M2

## 2022-09-23 DIAGNOSIS — F43.23 ADJUSTMENT DISORDER WITH MIXED ANXIETY AND DEPRESSED MOOD: ICD-10-CM

## 2022-09-23 DIAGNOSIS — Z23 NEEDS FLU SHOT: ICD-10-CM

## 2022-09-23 DIAGNOSIS — J44.9 CHRONIC OBSTRUCTIVE PULMONARY DISEASE, UNSPECIFIED COPD TYPE: ICD-10-CM

## 2022-09-23 DIAGNOSIS — E78.2 MIXED HYPERLIPIDEMIA: ICD-10-CM

## 2022-09-23 DIAGNOSIS — Z79.899 ENCOUNTER FOR LONG-TERM (CURRENT) USE OF MEDICATIONS: ICD-10-CM

## 2022-09-23 DIAGNOSIS — K21.9 GASTROESOPHAGEAL REFLUX DISEASE, UNSPECIFIED WHETHER ESOPHAGITIS PRESENT: ICD-10-CM

## 2022-09-23 DIAGNOSIS — I10 PRIMARY HYPERTENSION: Primary | ICD-10-CM

## 2022-09-23 LAB
ALBUMIN SERPL-MCNC: 4.5 G/DL (ref 3.5–5.2)
ALBUMIN/GLOB SERPL: 1.8 G/DL
ALP SERPL-CCNC: 84 U/L (ref 39–117)
ALT SERPL-CCNC: 20 U/L (ref 1–33)
AST SERPL-CCNC: 19 U/L (ref 1–32)
BASOPHILS # BLD AUTO: 0.04 10*3/MM3 (ref 0–0.2)
BASOPHILS NFR BLD AUTO: 0.6 % (ref 0–1.5)
BILIRUB SERPL-MCNC: 0.3 MG/DL (ref 0–1.2)
BUN SERPL-MCNC: 18 MG/DL (ref 8–23)
BUN/CREAT SERPL: 25 (ref 7–25)
CALCIUM SERPL-MCNC: 9.6 MG/DL (ref 8.6–10.5)
CHLORIDE SERPL-SCNC: 103 MMOL/L (ref 98–107)
CHOLEST SERPL-MCNC: 145 MG/DL (ref 0–200)
CO2 SERPL-SCNC: 26.4 MMOL/L (ref 22–29)
CREAT SERPL-MCNC: 0.72 MG/DL (ref 0.57–1)
EGFRCR SERPLBLD CKD-EPI 2021: 95.3 ML/MIN/1.73
EOSINOPHIL # BLD AUTO: 0.16 10*3/MM3 (ref 0–0.4)
EOSINOPHIL NFR BLD AUTO: 2.5 % (ref 0.3–6.2)
ERYTHROCYTE [DISTWIDTH] IN BLOOD BY AUTOMATED COUNT: 11.9 % (ref 12.3–15.4)
GLOBULIN SER CALC-MCNC: 2.5 GM/DL
GLUCOSE SERPL-MCNC: 114 MG/DL (ref 65–99)
HCT VFR BLD AUTO: 43.3 % (ref 34–46.6)
HDLC SERPL-MCNC: 42 MG/DL (ref 40–60)
HGB BLD-MCNC: 15 G/DL (ref 12–15.9)
IMM GRANULOCYTES # BLD AUTO: 0.05 10*3/MM3 (ref 0–0.05)
IMM GRANULOCYTES NFR BLD AUTO: 0.8 % (ref 0–0.5)
LDLC SERPL CALC-MCNC: 81 MG/DL (ref 0–100)
LYMPHOCYTES # BLD AUTO: 1.33 10*3/MM3 (ref 0.7–3.1)
LYMPHOCYTES NFR BLD AUTO: 21.1 % (ref 19.6–45.3)
MCH RBC QN AUTO: 31.8 PG (ref 26.6–33)
MCHC RBC AUTO-ENTMCNC: 34.6 G/DL (ref 31.5–35.7)
MCV RBC AUTO: 91.7 FL (ref 79–97)
MONOCYTES # BLD AUTO: 0.68 10*3/MM3 (ref 0.1–0.9)
MONOCYTES NFR BLD AUTO: 10.8 % (ref 5–12)
NEUTROPHILS # BLD AUTO: 4.05 10*3/MM3 (ref 1.7–7)
NEUTROPHILS NFR BLD AUTO: 64.2 % (ref 42.7–76)
NRBC BLD AUTO-RTO: 0 /100 WBC (ref 0–0.2)
PLATELET # BLD AUTO: 317 10*3/MM3 (ref 140–450)
POTASSIUM SERPL-SCNC: 4.5 MMOL/L (ref 3.5–5.2)
PROT SERPL-MCNC: 7 G/DL (ref 6–8.5)
RBC # BLD AUTO: 4.72 10*6/MM3 (ref 3.77–5.28)
SODIUM SERPL-SCNC: 139 MMOL/L (ref 136–145)
TRIGL SERPL-MCNC: 120 MG/DL (ref 0–150)
TSH SERPL DL<=0.005 MIU/L-ACNC: 0.49 UIU/ML (ref 0.27–4.2)
VLDLC SERPL CALC-MCNC: 22 MG/DL (ref 5–40)
WBC # BLD AUTO: 6.31 10*3/MM3 (ref 3.4–10.8)

## 2022-09-23 PROCEDURE — 99214 OFFICE O/P EST MOD 30 MIN: CPT | Performed by: FAMILY MEDICINE

## 2022-09-23 PROCEDURE — G0008 ADMIN INFLUENZA VIRUS VAC: HCPCS | Performed by: FAMILY MEDICINE

## 2022-09-23 PROCEDURE — 90686 IIV4 VACC NO PRSV 0.5 ML IM: CPT | Performed by: FAMILY MEDICINE

## 2022-09-23 RX ORDER — SIMVASTATIN 20 MG
20 TABLET ORAL NIGHTLY
Qty: 90 TABLET | Refills: 3 | Status: SHIPPED | OUTPATIENT
Start: 2022-09-23 | End: 2023-03-24 | Stop reason: SDUPTHER

## 2022-09-23 RX ORDER — LOSARTAN POTASSIUM 50 MG/1
50 TABLET ORAL 2 TIMES DAILY
Qty: 180 TABLET | Refills: 1 | Status: SHIPPED | OUTPATIENT
Start: 2022-09-23 | End: 2023-03-24 | Stop reason: SDUPTHER

## 2022-09-23 RX ORDER — PANTOPRAZOLE SODIUM 40 MG/1
40 TABLET, DELAYED RELEASE ORAL DAILY
Qty: 90 TABLET | Refills: 2 | Status: SHIPPED | OUTPATIENT
Start: 2022-09-23 | End: 2022-11-14

## 2022-09-23 RX ORDER — CITALOPRAM 40 MG/1
40 TABLET ORAL NIGHTLY
Qty: 90 TABLET | Refills: 1 | Status: SHIPPED | OUTPATIENT
Start: 2022-09-23 | End: 2023-03-24 | Stop reason: SDUPTHER

## 2022-09-23 NOTE — PROGRESS NOTES
"Chief Complaint  COPD, Hyperlipidemia, and Hypertension    Subjective        María Colón presents to [facility]  History of Present Illness  Patient is here to follow-up on her chronic health conditions:    Hypertension.  The patient states that she was diagnosed approximately in the 1990s.  She takes losartan 50 mg twice daily.  Blood pressures have been well controlled.    Hyperlipidemia.  The patient was diagnosed shortly after she was diagnosed with hypertension and she has been on cholesterol medicine for many years.  Currently she takes simvastatin 20 mg daily.  She denies any side effects.    Depression.  Patient takes Celexa 40 mg daily.  Patient is doing well.  Would like to continue it.  No SI/HI    Oxygen dependent COPD.  Patient is still smoking.  She is seeing pulmonology, Dr Rufino Gordillo, and currently she is taking Trelegy and albuterol as needed.    Stress urinary incontinence.  The patient used to take Vesicare which did help her symptoms but she has been out of that for a while.  She has not tried performing Kegel maneuvers to help with her symptoms.    GERD.  The patient takes pantoprazole 40 mg daily.  Her symptoms are well controlled with the pantoprazole.      Objective   Vital Signs:  /70   Pulse 80   Temp 98.1 °F (36.7 °C)   Ht 160 cm (63\")   Wt 94 kg (207 lb 3.2 oz)   SpO2 94%   BMI 36.70 kg/m²   Estimated body mass index is 36.7 kg/m² as calculated from the following:    Height as of this encounter: 160 cm (63\").    Weight as of this encounter: 94 kg (207 lb 3.2 oz).          Physical Exam  Vitals and nursing note reviewed.   Constitutional:       Appearance: Normal appearance. She is well-developed. She is obese.   HENT:      Head: Normocephalic and atraumatic.      Right Ear: Tympanic membrane, ear canal and external ear normal.      Left Ear: Tympanic membrane, ear canal and external ear normal.   Eyes:      General: No scleral icterus.     " Extraocular Movements: Extraocular movements intact.      Conjunctiva/sclera: Conjunctivae normal.   Cardiovascular:      Rate and Rhythm: Normal rate and regular rhythm.      Heart sounds: Normal heart sounds.   Pulmonary:      Effort: Pulmonary effort is normal.      Breath sounds: Normal breath sounds.   Musculoskeletal:         General: Normal range of motion.      Cervical back: Normal range of motion and neck supple.      Right lower leg: No edema.      Left lower leg: No edema.   Skin:     General: Skin is warm.      Findings: No rash.   Neurological:      Mental Status: She is alert and oriented to person, place, and time.   Psychiatric:         Mood and Affect: Mood normal.         Behavior: Behavior normal.         Thought Content: Thought content normal.         Judgment: Judgment normal.        Result Review :  The following data was reviewed by: Asuncion Pinto DO on 09/23/2022:  Common labs    Common Labs 12/13/21 12/13/21 12/13/21 3/22/22 3/22/22    1345 1345 1345 1202 1202   Glucose  89   93   BUN  16   17   Creatinine  0.70   0.62   eGFR Non  Am  94      eGFR African Am  109      Sodium  142   141   Potassium  4.5   4.4   Chloride  101   102   Calcium  10.0   9.5   Total Protein  7.6   7.0   Albumin  4.5   4.3   Total Bilirubin  0.2   0.3   Alkaline Phosphatase  93   91   AST (SGOT)  23   20   ALT (SGPT)  24   25   WBC 7.9   7.8    Hemoglobin 16.5 (A)   15.6    Hematocrit 48.7 (A)   45.9    Platelets 341   294    Total Cholesterol   167     Triglycerides   265 (A)     HDL Cholesterol   47     LDL Cholesterol    77     (A) Abnormal value       Comments are available for some flowsheets but are not being displayed.           TSH    TSH 12/13/21   TSH 0.645                     Assessment and Plan   Diagnoses and all orders for this visit:    1. Primary hypertension (Primary)  -     losartan (COZAAR) 50 MG tablet; Take 1 tablet by mouth 2 (Two) Times a Day.  Dispense: 180 tablet; Refill: 1  -      Comprehensive Metabolic Panel    2. Mixed hyperlipidemia  -     simvastatin (ZOCOR) 20 MG tablet; Take 1 tablet by mouth Every Night.  Dispense: 90 tablet; Refill: 3  -     Lipid Panel    3. Gastroesophageal reflux disease, unspecified whether esophagitis present  -     pantoprazole (PROTONIX) 40 MG EC tablet; Take 1 tablet by mouth Daily.  Dispense: 90 tablet; Refill: 2    4. Chronic obstructive pulmonary disease, unspecified COPD type (HCC)    5. Adjustment disorder with mixed anxiety and depressed mood  -     citalopram (CeleXA) 40 MG tablet; Take 1 tablet by mouth Every Night.  Dispense: 90 tablet; Refill: 1    6. Encounter for long-term (current) use of medications  -     Comprehensive Metabolic Panel  -     CBC & Differential  -     TSH  -     Lipid Panel    7. Needs flu shot  -     FluLaval/Fluzone >6 mos ([id number])    Patient is here today for chronic stable hypertension, hyperlipidemia, GERD, COPD, and depression.  Surveillance labs were obtained today and any medication changes will be made based on lab results and will be called to the patient later this week.         Follow Up   Return in about 6 months (around 3/23/2023) for Medicare Wellness.  Patient was given instructions and counseling regarding her condition or for health maintenance advice. Please see specific information pulled into the AVS if appropriate.

## 2022-09-30 ENCOUNTER — TRANSCRIBE ORDERS (OUTPATIENT)
Dept: ADMINISTRATIVE | Facility: HOSPITAL | Age: 62
End: 2022-09-30

## 2022-09-30 ENCOUNTER — APPOINTMENT (OUTPATIENT)
Dept: BONE DENSITY | Facility: HOSPITAL | Age: 62
End: 2022-09-30

## 2022-09-30 ENCOUNTER — HOSPITAL ENCOUNTER (OUTPATIENT)
Dept: MAMMOGRAPHY | Facility: HOSPITAL | Age: 62
End: 2022-09-30

## 2022-09-30 DIAGNOSIS — Z12.31 VISIT FOR SCREENING MAMMOGRAM: Primary | ICD-10-CM

## 2022-10-03 DIAGNOSIS — Z13.820 ENCOUNTER FOR SCREENING FOR OSTEOPOROSIS: Primary | ICD-10-CM

## 2022-10-03 DIAGNOSIS — Z78.0 MENOPAUSE: ICD-10-CM

## 2022-10-12 RX ORDER — POLYETHYLENE GLYCOL-3350 AND ELECTROLYTES 236; 6.74; 5.86; 2.97; 22.74 G/274.31G; G/274.31G; G/274.31G; G/274.31G; G/274.31G
POWDER, FOR SOLUTION ORAL
OUTPATIENT
Start: 2022-10-12

## 2022-10-14 ENCOUNTER — TELEPHONE (OUTPATIENT)
Dept: FAMILY MEDICINE CLINIC | Facility: CLINIC | Age: 62
End: 2022-10-14

## 2022-10-14 DIAGNOSIS — H53.9 VISION CHANGES: Primary | ICD-10-CM

## 2022-10-14 NOTE — TELEPHONE ENCOUNTER
Spoke with pt and she needs a referral before she can  make appt to [facility] already went to Went to [facility] and they suggested a eye specialist, pt aware that you are out of the office today and Monday,

## 2022-10-14 NOTE — TELEPHONE ENCOUNTER
Caller: María Colón    Relationship: Self    Best call back number: [phone]    What is the medical concern/diagnosis: LEFT EYE    What specialty or service is being requested: LEFT EYE PAIN     What is the provider, practice or medical service name: [facility]    What is the office location: [address] [place] [address]    What is the office phone number: [phone]    Any additional details: PATIENT STATES SHE HAS BEEN HAVING PAIN IN HER LEFT EYE, SHE ALSO IS EXPERIENCING A WHITE FLASH OUT OF HER EYE.     PATIENT ALSO STATES THIS REFERRAL REQUEST IS DUE TO INSURANCE PURPOSES.       PLEASE CALL AND ADVISE IF ANY QUESTIONS OR CONCERNS.

## 2022-11-13 DIAGNOSIS — K21.9 GASTROESOPHAGEAL REFLUX DISEASE, UNSPECIFIED WHETHER ESOPHAGITIS PRESENT: ICD-10-CM

## 2022-11-14 RX ORDER — PANTOPRAZOLE SODIUM 40 MG/1
TABLET, DELAYED RELEASE ORAL
Qty: 90 TABLET | Refills: 2 | Status: SHIPPED | OUTPATIENT
Start: 2022-11-14 | End: 2023-03-24 | Stop reason: SDUPTHER

## 2022-11-15 DIAGNOSIS — K21.9 GASTROESOPHAGEAL REFLUX DISEASE, UNSPECIFIED WHETHER ESOPHAGITIS PRESENT: ICD-10-CM

## 2022-11-15 RX ORDER — PANTOPRAZOLE SODIUM 40 MG/1
40 TABLET, DELAYED RELEASE ORAL DAILY
Qty: 90 TABLET | Refills: 2 | OUTPATIENT
Start: 2022-11-15

## 2022-11-15 NOTE — TELEPHONE ENCOUNTER
----- Message from María Colón sent at 11/15/2022  7:49 AM EST -----  Regarding: I need prescription for protonic to be called in   Contact: [phone]  I had message saying protonic needs to be called in by   Thanks   María Colón

## 2022-11-15 NOTE — TELEPHONE ENCOUNTER
----- Message from María Colón sent at 11/15/2022  7:49 AM EST -----  Regarding: I need prescription for protonic to be called in   Contact: [phone]  I had message saying protonic needs to be called in by   Thanks   María Colón

## 2022-12-19 DIAGNOSIS — N39.3 URINARY, INCONTINENCE, STRESS FEMALE: ICD-10-CM

## 2022-12-19 RX ORDER — SOLIFENACIN SUCCINATE 10 MG/1
TABLET, FILM COATED ORAL
Qty: 90 TABLET | Refills: 2 | OUTPATIENT
Start: 2022-12-19

## 2023-01-04 DIAGNOSIS — N39.3 URINARY, INCONTINENCE, STRESS FEMALE: ICD-10-CM

## 2023-01-04 RX ORDER — SOLIFENACIN SUCCINATE 10 MG/1
TABLET, FILM COATED ORAL
Qty: 90 TABLET | Refills: 2 | OUTPATIENT
Start: 2023-01-04

## 2023-01-10 DIAGNOSIS — N39.3 URINARY, INCONTINENCE, STRESS FEMALE: ICD-10-CM

## 2023-01-10 RX ORDER — SOLIFENACIN SUCCINATE 10 MG/1
TABLET, FILM COATED ORAL
Qty: 90 TABLET | Refills: 0 | Status: SHIPPED | OUTPATIENT
Start: 2023-01-10 | End: 2023-02-27

## 2023-02-25 DIAGNOSIS — J30.1 SEASONAL ALLERGIC RHINITIS DUE TO POLLEN: ICD-10-CM

## 2023-02-25 DIAGNOSIS — N39.3 URINARY, INCONTINENCE, STRESS FEMALE: ICD-10-CM

## 2023-02-27 RX ORDER — SOLIFENACIN SUCCINATE 10 MG/1
TABLET, FILM COATED ORAL
Qty: 90 TABLET | Refills: 0 | Status: SHIPPED | OUTPATIENT
Start: 2023-02-27 | End: 2023-03-01 | Stop reason: SDUPTHER

## 2023-02-27 RX ORDER — FLUTICASONE PROPIONATE 50 MCG
SPRAY, SUSPENSION (ML) NASAL
Qty: 16 ML | Refills: 0 | Status: SHIPPED | OUTPATIENT
Start: 2023-02-27 | End: 2023-03-27

## 2023-03-01 ENCOUNTER — PATIENT MESSAGE (OUTPATIENT)
Dept: FAMILY MEDICINE CLINIC | Facility: CLINIC | Age: 63
End: 2023-03-01
Payer: MEDICARE

## 2023-03-01 DIAGNOSIS — N39.3 URINARY, INCONTINENCE, STRESS FEMALE: ICD-10-CM

## 2023-03-01 RX ORDER — SOLIFENACIN SUCCINATE 10 MG/1
10 TABLET, FILM COATED ORAL EVERY MORNING
Qty: 30 TABLET | Refills: 1 | Status: SHIPPED | OUTPATIENT
Start: 2023-03-01 | End: 2023-03-24 | Stop reason: SDUPTHER

## 2023-03-01 NOTE — TELEPHONE ENCOUNTER
From: María Colón  To: Asuncion Pinto  Sent: 3/1/2023 6:19 AM EST  Subject: Medication refill    I really need some type of bladder medication  Thanks  María Colón

## 2023-03-24 ENCOUNTER — OFFICE VISIT (OUTPATIENT)
Dept: FAMILY MEDICINE CLINIC | Facility: CLINIC | Age: 63
End: 2023-03-24
Payer: MEDICARE

## 2023-03-24 VITALS
BODY MASS INDEX: 34.87 KG/M2 | SYSTOLIC BLOOD PRESSURE: 136 MMHG | DIASTOLIC BLOOD PRESSURE: 84 MMHG | OXYGEN SATURATION: 94 % | TEMPERATURE: 97.8 F | HEART RATE: 90 BPM | WEIGHT: 196.8 LBS | HEIGHT: 63 IN

## 2023-03-24 DIAGNOSIS — K21.9 GASTROESOPHAGEAL REFLUX DISEASE, UNSPECIFIED WHETHER ESOPHAGITIS PRESENT: ICD-10-CM

## 2023-03-24 DIAGNOSIS — N39.3 URINARY, INCONTINENCE, STRESS FEMALE: ICD-10-CM

## 2023-03-24 DIAGNOSIS — E78.2 MIXED HYPERLIPIDEMIA: ICD-10-CM

## 2023-03-24 DIAGNOSIS — Z79.899 ENCOUNTER FOR LONG-TERM (CURRENT) USE OF MEDICATIONS: ICD-10-CM

## 2023-03-24 DIAGNOSIS — Z00.00 MEDICARE ANNUAL WELLNESS VISIT, SUBSEQUENT: Primary | ICD-10-CM

## 2023-03-24 DIAGNOSIS — J44.9 CHRONIC OBSTRUCTIVE PULMONARY DISEASE, UNSPECIFIED COPD TYPE: ICD-10-CM

## 2023-03-24 DIAGNOSIS — I10 PRIMARY HYPERTENSION: ICD-10-CM

## 2023-03-24 DIAGNOSIS — F43.23 ADJUSTMENT DISORDER WITH MIXED ANXIETY AND DEPRESSED MOOD: ICD-10-CM

## 2023-03-24 RX ORDER — LOSARTAN POTASSIUM 50 MG/1
50 TABLET ORAL 2 TIMES DAILY
Qty: 180 TABLET | Refills: 1 | Status: SHIPPED | OUTPATIENT
Start: 2023-03-24

## 2023-03-24 RX ORDER — PANTOPRAZOLE SODIUM 40 MG/1
40 TABLET, DELAYED RELEASE ORAL DAILY
Qty: 90 TABLET | Refills: 2 | Status: SHIPPED | OUTPATIENT
Start: 2023-03-24

## 2023-03-24 RX ORDER — CITALOPRAM 40 MG/1
40 TABLET ORAL NIGHTLY
Qty: 90 TABLET | Refills: 1 | Status: SHIPPED | OUTPATIENT
Start: 2023-03-24

## 2023-03-24 RX ORDER — SOLIFENACIN SUCCINATE 10 MG/1
10 TABLET, FILM COATED ORAL EVERY MORNING
Qty: 90 TABLET | Refills: 1 | Status: SHIPPED | OUTPATIENT
Start: 2023-03-24

## 2023-03-24 RX ORDER — SIMVASTATIN 20 MG
20 TABLET ORAL NIGHTLY
Qty: 90 TABLET | Refills: 3 | Status: SHIPPED | OUTPATIENT
Start: 2023-03-24

## 2023-03-24 RX ORDER — PHENOL 1.4 %
600 AEROSOL, SPRAY (ML) MUCOUS MEMBRANE DAILY
COMMUNITY

## 2023-03-24 NOTE — PROGRESS NOTES
The ABCs of the Annual Wellness Visit  Subsequent Medicare Wellness Visit    Subjective    Maraí Colón is a 63 y.o. female who presents for a Subsequent Medicare Wellness Visit.    The following portions of the patient's history were reviewed and   updated as appropriate: allergies, current medications, past family history, past medical history, past social history, past surgical history and problem list.    Compared to one year ago, the patient feels her physical   health is the same.    Compared to one year ago, the patient feels her mental   health is the same.    Recent Hospitalizations:  She was not admitted to the hospital during the last year.       Current Medical Providers:  Patient Care Team:  Asuncion Pinto DO as PCP - General (Family Medicine)    Outpatient Medications Prior to Visit   Medication Sig Dispense Refill   • Acetaminophen (TYLENOL ARTHRITIS PAIN PO) Take 2 capsules by mouth Every 6 (Six) Hours As Needed. Unsure of dose     • albuterol sulfate  (90 Base) MCG/ACT inhaler Inhale 2 puffs Every 4 (Four) Hours As Needed for Wheezing.     • budesonide-formoterol (SYMBICORT) 160-4.5 MCG/ACT inhaler Inhale 2 puffs 2 (Two) Times a Day.     • calcium carbonate (OS-LASHA) 600 MG tablet Take 1 tablet by mouth Daily.     • celecoxib (CeleBREX) 200 MG capsule Take 1 capsule by mouth 2 (Two) Times a Day. PT TO STOP PER MD INSTRUCTION     • Cholecalciferol (VITAMIN D) 1000 units tablet Take 1 tablet by mouth Daily As Needed.     • fluticasone (FLONASE) 50 MCG/ACT nasal spray SPRAY ONE SPRAY IN EACH NOSTRIL ONCE DAILY AS NEEDED FOR RHINITIS OR ALLERGIES 16 mL 0   • Fluticasone-Umeclidin-Vilant (TRELEGY) 100-62.5-25 MCG/INH inhaler Inhale 1 puff Daily.     • Loratadine 10 MG capsule Take 1 capsule by mouth Daily As Needed.     • Misc Natural Products (GLUCOSAMINE CHONDROITIN MSM PO) Take 1 capsule by mouth 2 (Two) Times a Day. Unsure of dosage; PT TO STOP PER MD INSTRUCTION     • Multiple  "Vitamins-Minerals (MULTIVITAMIN ADULT PO) Take 1 tablet by mouth Every Morning. Unsure of last dose     • O2 (OXYGEN) Inhale 2 L/min 1 (One) Time. night     • Tiotropium Bromide Monohydrate (Spiriva Respimat) 1.25 MCG/ACT aerosol solution inhaler Inhale 2 puffs Daily.     • vitamin E 1000 UNIT capsule Take 1 capsule by mouth 2 (Two) Times a Day.     • citalopram (CeleXA) 40 MG tablet Take 1 tablet by mouth Every Night. 90 tablet 1   • losartan (COZAAR) 50 MG tablet Take 1 tablet by mouth 2 (Two) Times a Day. 180 tablet 1   • pantoprazole (PROTONIX) 40 MG EC tablet TAKE ONE TABLET BY MOUTH DAILY 90 tablet 2   • simvastatin (ZOCOR) 20 MG tablet Take 1 tablet by mouth Every Night. 90 tablet 3   • solifenacin (VESICARE) 10 MG tablet Take 1 tablet by mouth Every Morning. 30 tablet 1     No facility-administered medications prior to visit.       No opioid medication identified on active medication list. I have reviewed chart for other potential  high risk medication/s and harmful drug interactions in the elderly.          Aspirin is not on active medication list.  Aspirin use is not indicated based on review of current medical condition/s. Risk of harm outweighs potential benefits.  .    Patient Active Problem List   Diagnosis   • Cellulitis of left upper extremity   • Hypertension   • Rheumatoid arthritis (HCC)   • Olecranon bursitis of left elbow   • Hyperlipidemia   • Oxygen dependent   • History of colon polyps     Advance Care Planning  Advance Directive is not on file.  ACP discussion was held with the patient during this visit. Patient does not have an advance directive, information provided.     Objective    Vitals:    03/24/23 0951   BP: 136/84   Pulse: 90   Temp: 97.8 °F (36.6 °C)   SpO2: 94%   Weight: 89.3 kg (196 lb 12.8 oz)   Height: 160 cm (63\")     Estimated body mass index is 34.86 kg/m² as calculated from the following:    Height as of this encounter: 160 cm (63\").    Weight as of this encounter: 89.3 kg " (196 lb 12.8 oz).    BMI is >= 30 and <35. (Class 1 Obesity). The following options were offered after discussion;: exercise counseling/recommendations and nutrition counseling/recommendations      Does the patient have evidence of cognitive impairment? No          HEALTH RISK ASSESSMENT    Smoking Status:  Social History     Tobacco Use   Smoking Status Every Day   • Packs/day: 0.50   • Years: 45.00   • Pack years: 22.50   • Types: Cigarettes   Smokeless Tobacco Never   Tobacco Comments    Began smoking at age 17.  Smoked 1 ppd for 31 years and .5 ppd for 10 years for a 36 pack year history. did quit  dec 2017 and then started back  May 2018       Alcohol Consumption:  Social History     Substance and Sexual Activity   Alcohol Use Not Currently    Comment: RARE USE     Fall Risk Screen:    STEADI Fall Risk Assessment was completed, and patient is at LOW risk for falls.Assessment completed on:3/24/2023    Depression Screening:  PHQ-2/PHQ-9 Depression Screening 3/24/2023   Little Interest or Pleasure in Doing Things 0-->not at all   Feeling Down, Depressed or Hopeless 0-->not at all   PHQ-9: Brief Depression Severity Measure Score 0       Health Habits and Functional and Cognitive Screening:  Functional & Cognitive Status 3/22/2022   Do you have difficulty preparing food and eating? No   Do you have difficulty bathing yourself, getting dressed or grooming yourself? No   Do you have difficulty using the toilet? No   Do you have difficulty moving around from place to place? No   Do you have trouble with steps or getting out of a bed or a chair? No   Current Diet Low Carb Diet   Dental Exam Not up to date   Eye Exam Other   Exercise (times per week) 0 times per week   Current Exercises Include No Regular Exercise   Do you need help using the phone?  No   Are you deaf or do you have serious difficulty hearing?  No   Do you need help with transportation? Yes   Do you need help shopping? Yes   Do you need help preparing  meals?  No   Do you need help with housework?  No   Do you need help with laundry? No   Do you need help taking your medications? No   Do you need help managing money? No   Do you ever drive or ride in a car without wearing a seat belt? No       Age-appropriate Screening Schedule:  Refer to the list below for future screening recommendations based on patient's age, sex and/or medical conditions. Orders for these recommended tests are listed in the plan section. The patient has been provided with a written plan.    Health Maintenance   Topic Date Due   • DXA SCAN  Never done   • TDAP/TD VACCINES (1 - Tdap) Never done   • ZOSTER VACCINE (1 of 2) Never done   • Pneumococcal Vaccine 0-64 (2 - PCV) 01/01/2016   • HEPATITIS C SCREENING  Never done   • COLORECTAL CANCER SCREENING  05/27/2020   • COVID-19 Vaccine (3 - Booster for Moderna series) 05/31/2021   • LUNG CANCER SCREENING  05/02/2023   • LIPID PANEL  09/23/2023   • ANNUAL WELLNESS VISIT  03/24/2024   • MAMMOGRAM  09/30/2024   • INFLUENZA VACCINE  Completed   • PAP SMEAR  Discontinued                CMS Preventative Services Quick Reference  Risk Factors Identified During Encounter  Immunizations Discussed/Encouraged: Influenza, Prevnar 20 (Pneumococcal 20-valent conjugate), Shingrix and COVID19  Dental Screening Recommended  Vision Screening Recommended  The above risks/problems have been discussed with the patient.  Pertinent information has been shared with the patient in the After Visit Summary.  An After Visit Summary and PPPS were made available to the patient.    Follow Up:   Next Medicare Wellness visit to be scheduled in 1 year.       Additional E&M Note during same encounter follows:  Patient has multiple medical problems which are significant and separately identifiable that require additional work above and beyond the Medicare Wellness Visit.      Chief Complaint  Medicare Wellness-subsequent    Subjective        HPI  María Colón is also being  "seen today for follow-up on her chronic health conditions:    Hypertension.  The patient states that she was diagnosed approximately in the 1990s.  She takes losartan 50 mg twice daily.  Blood pressures have been well controlled.    Hyperlipidemia.  The patient was diagnosed shortly after she was diagnosed with hypertension and she has been on cholesterol medicine for many years.  Currently she takes simvastatin 20 mg daily.  She denies any side effects.    Depression.  Patient takes Celexa 40 mg daily.  Patient is doing well.  Would like to continue it.  No SI/HI    Oxygen dependent COPD.  Patient is still smoking.  She is seeing pulmonology, Dr Rufino Gordillo, and currently she is taking Trelegy and albuterol as needed.    Stress urinary incontinence.  The patient is currently taking Vesicare which helps her symptoms.  She has not tried performing Kegel maneuvers to help with her symptoms.    GERD.  The patient takes pantoprazole 40 mg daily.  Her symptoms are well controlled with the pantoprazole.         Objective   Vital Signs:  /84   Pulse 90   Temp 97.8 °F (36.6 °C)   Ht 160 cm (63\")   Wt 89.3 kg (196 lb 12.8 oz)   SpO2 94%   BMI 34.86 kg/m²     Physical Exam  Vitals and nursing note reviewed.   Constitutional:       Appearance: Normal appearance. She is well-developed. She is obese.   HENT:      Head: Normocephalic and atraumatic.      Right Ear: External ear normal.      Left Ear: External ear normal.      Nose: Nose normal.   Eyes:      General: No scleral icterus.     Conjunctiva/sclera: Conjunctivae normal.   Cardiovascular:      Rate and Rhythm: Normal rate and regular rhythm.      Heart sounds: Normal heart sounds.   Pulmonary:      Effort: Pulmonary effort is normal.      Breath sounds: Normal breath sounds.   Musculoskeletal:      Cervical back: Normal range of motion and neck supple.   Lymphadenopathy:      Cervical: No cervical adenopathy.   Skin:     General: Skin is warm and dry.      " Findings: No rash.   Neurological:      Mental Status: She is alert and oriented to person, place, and time.   Psychiatric:         Mood and Affect: Mood normal.         Behavior: Behavior normal.         Thought Content: Thought content normal.         Judgment: Judgment normal.          The following data was reviewed by: Asuncion Pinto DO on 03/24/2023:  Common labs    Common Labs 9/23/22 9/23/22 9/23/22    1020 1020 1020   Glucose 114 (A)     BUN 18     Creatinine 0.72     Sodium 139     Potassium 4.5     Chloride 103     Calcium 9.6     Total Protein 7.0     Albumin 4.50     Total Bilirubin 0.3     Alkaline Phosphatase 84     AST (SGOT) 19     ALT (SGPT) 20     WBC  6.31    Hemoglobin  15.0    Hematocrit  43.3    Platelets  317    Total Cholesterol   145   Triglycerides   120   HDL Cholesterol   42   LDL Cholesterol    81   (A) Abnormal value       Comments are available for some flowsheets but are not being displayed.           TSH    TSH 9/23/22   TSH 0.494                      Assessment and Plan   Diagnoses and all orders for this visit:    1. Medicare annual wellness visit, subsequent (Primary)    2. Primary hypertension  -     Comprehensive Metabolic Panel  -     losartan (COZAAR) 50 MG tablet; Take 1 tablet by mouth 2 (Two) Times a Day.  Dispense: 180 tablet; Refill: 1    3. Mixed hyperlipidemia  -     Lipid Panel  -     simvastatin (ZOCOR) 20 MG tablet; Take 1 tablet by mouth Every Night.  Dispense: 90 tablet; Refill: 3    4. Gastroesophageal reflux disease, unspecified whether esophagitis present  -     pantoprazole (PROTONIX) 40 MG EC tablet; Take 1 tablet by mouth Daily.  Dispense: 90 tablet; Refill: 2    5. Chronic obstructive pulmonary disease, unspecified COPD type (HCC)    6. Adjustment disorder with mixed anxiety and depressed mood  -     citalopram (CeleXA) 40 MG tablet; Take 1 tablet by mouth Every Night.  Dispense: 90 tablet; Refill: 1    7. Encounter for long-term (current) use of  medications  -     CBC & Differential  -     Comprehensive Metabolic Panel  -     TSH  -     Lipid Panel    8. Urinary, incontinence, stress female  -     solifenacin (VESICARE) 10 MG tablet; Take 1 tablet by mouth Every Morning.  Dispense: 90 tablet; Refill: 1    Routine health maintenance.  Colon cancer screening is up-to-date, patient is scheduled for lung cancer screening, mammogram, and DEXA scan in the next few months.    Patient is also here for chronic stable hypertension, hyperlipidemia, GERD, COPD, adjustment disorder, and urinary continence.  Surveillance labs were obtained today and any medication changes will be made based on lab results and will be called to the patient later this week.         Follow Up   Return in about 6 months (around 9/24/2023) for HTN.  Patient was given instructions and counseling regarding her condition or for health maintenance advice. Please see specific information pulled into the AVS if appropriate.

## 2023-03-25 LAB
ALBUMIN SERPL-MCNC: 4.9 G/DL (ref 3.5–5.2)
ALBUMIN/GLOB SERPL: 2 G/DL
ALP SERPL-CCNC: 91 U/L (ref 39–117)
ALT SERPL-CCNC: 21 U/L (ref 1–33)
AST SERPL-CCNC: 17 U/L (ref 1–32)
BASOPHILS # BLD AUTO: 0.05 10*3/MM3 (ref 0–0.2)
BASOPHILS NFR BLD AUTO: 0.6 % (ref 0–1.5)
BILIRUB SERPL-MCNC: 0.5 MG/DL (ref 0–1.2)
BUN SERPL-MCNC: 21 MG/DL (ref 8–23)
BUN/CREAT SERPL: 26.9 (ref 7–25)
CALCIUM SERPL-MCNC: 10.6 MG/DL (ref 8.6–10.5)
CHLORIDE SERPL-SCNC: 103 MMOL/L (ref 98–107)
CHOLEST SERPL-MCNC: 143 MG/DL (ref 0–200)
CO2 SERPL-SCNC: 26.1 MMOL/L (ref 22–29)
CREAT SERPL-MCNC: 0.78 MG/DL (ref 0.57–1)
EGFRCR SERPLBLD CKD-EPI 2021: 86 ML/MIN/1.73
EOSINOPHIL # BLD AUTO: 0.18 10*3/MM3 (ref 0–0.4)
EOSINOPHIL NFR BLD AUTO: 2 % (ref 0.3–6.2)
ERYTHROCYTE [DISTWIDTH] IN BLOOD BY AUTOMATED COUNT: 12.2 % (ref 12.3–15.4)
GLOBULIN SER CALC-MCNC: 2.4 GM/DL
GLUCOSE SERPL-MCNC: 108 MG/DL (ref 65–99)
HCT VFR BLD AUTO: 46.8 % (ref 34–46.6)
HDLC SERPL-MCNC: 40 MG/DL (ref 40–60)
HGB BLD-MCNC: 16.3 G/DL (ref 12–15.9)
IMM GRANULOCYTES # BLD AUTO: 0.08 10*3/MM3 (ref 0–0.05)
IMM GRANULOCYTES NFR BLD AUTO: 0.9 % (ref 0–0.5)
LDLC SERPL CALC-MCNC: 74 MG/DL (ref 0–100)
LYMPHOCYTES # BLD AUTO: 1.49 10*3/MM3 (ref 0.7–3.1)
LYMPHOCYTES NFR BLD AUTO: 16.5 % (ref 19.6–45.3)
MCH RBC QN AUTO: 31.4 PG (ref 26.6–33)
MCHC RBC AUTO-ENTMCNC: 34.8 G/DL (ref 31.5–35.7)
MCV RBC AUTO: 90.2 FL (ref 79–97)
MONOCYTES # BLD AUTO: 0.92 10*3/MM3 (ref 0.1–0.9)
MONOCYTES NFR BLD AUTO: 10.2 % (ref 5–12)
NEUTROPHILS # BLD AUTO: 6.33 10*3/MM3 (ref 1.7–7)
NEUTROPHILS NFR BLD AUTO: 69.8 % (ref 42.7–76)
NRBC BLD AUTO-RTO: 0 /100 WBC (ref 0–0.2)
PLATELET # BLD AUTO: 303 10*3/MM3 (ref 140–450)
POTASSIUM SERPL-SCNC: 5.1 MMOL/L (ref 3.5–5.2)
PROT SERPL-MCNC: 7.3 G/DL (ref 6–8.5)
RBC # BLD AUTO: 5.19 10*6/MM3 (ref 3.77–5.28)
SODIUM SERPL-SCNC: 143 MMOL/L (ref 136–145)
TRIGL SERPL-MCNC: 167 MG/DL (ref 0–150)
TSH SERPL DL<=0.005 MIU/L-ACNC: 0.7 UIU/ML (ref 0.27–4.2)
VLDLC SERPL CALC-MCNC: 29 MG/DL (ref 5–40)
WBC # BLD AUTO: 9.05 10*3/MM3 (ref 3.4–10.8)

## 2023-03-27 DIAGNOSIS — J30.1 SEASONAL ALLERGIC RHINITIS DUE TO POLLEN: ICD-10-CM

## 2023-03-27 RX ORDER — FLUTICASONE PROPIONATE 50 MCG
SPRAY, SUSPENSION (ML) NASAL
Qty: 16 ML | Refills: 6 | Status: SHIPPED | OUTPATIENT
Start: 2023-03-27

## 2023-05-08 ENCOUNTER — HOSPITAL ENCOUNTER (OUTPATIENT)
Dept: CT IMAGING | Facility: HOSPITAL | Age: 63
Discharge: HOME | End: 2023-05-08
Admitting: INTERNAL MEDICINE
Payer: MEDICARE

## 2023-05-08 DIAGNOSIS — R91.1 LUNG NODULE: ICD-10-CM

## 2023-05-08 PROCEDURE — 71250 CT THORAX DX C-: CPT

## 2023-06-12 ENCOUNTER — TRANSCRIBE ORDERS (OUTPATIENT)
Dept: ADMINISTRATIVE | Facility: HOSPITAL | Age: 63
End: 2023-06-12
Payer: MEDICARE

## 2023-06-12 DIAGNOSIS — R91.1 LUNG NODULE: Primary | ICD-10-CM

## 2023-07-26 ENCOUNTER — OFFICE VISIT (OUTPATIENT)
Dept: FAMILY MEDICINE CLINIC | Facility: CLINIC | Age: 63
End: 2023-07-26
Payer: MEDICARE

## 2023-07-26 VITALS
DIASTOLIC BLOOD PRESSURE: 78 MMHG | SYSTOLIC BLOOD PRESSURE: 145 MMHG | WEIGHT: 198.4 LBS | BODY MASS INDEX: 35.15 KG/M2 | TEMPERATURE: 97.5 F | OXYGEN SATURATION: 91 % | HEIGHT: 63 IN | HEART RATE: 90 BPM

## 2023-07-26 DIAGNOSIS — N63.12 MASS OF UPPER INNER QUADRANT OF RIGHT BREAST: ICD-10-CM

## 2023-07-26 DIAGNOSIS — B36.9 DERMATOMYCOSIS: Primary | ICD-10-CM

## 2023-07-26 DIAGNOSIS — Z12.31 SCREENING MAMMOGRAM FOR BREAST CANCER: ICD-10-CM

## 2023-07-26 DIAGNOSIS — N64.4 BREAST PAIN, RIGHT: ICD-10-CM

## 2023-07-26 PROCEDURE — 99214 OFFICE O/P EST MOD 30 MIN: CPT | Performed by: FAMILY MEDICINE

## 2023-07-26 PROCEDURE — 1159F MED LIST DOCD IN RCRD: CPT | Performed by: FAMILY MEDICINE

## 2023-07-26 PROCEDURE — 1160F RVW MEDS BY RX/DR IN RCRD: CPT | Performed by: FAMILY MEDICINE

## 2023-07-26 PROCEDURE — 3078F DIAST BP <80 MM HG: CPT | Performed by: FAMILY MEDICINE

## 2023-07-26 PROCEDURE — 3077F SYST BP >= 140 MM HG: CPT | Performed by: FAMILY MEDICINE

## 2023-07-26 RX ORDER — VARENICLINE TARTRATE 1 MG/1
TABLET, FILM COATED ORAL
COMMUNITY
Start: 2023-06-25

## 2023-07-26 RX ORDER — NYSTATIN 100000 U/G
1 OINTMENT TOPICAL 3 TIMES DAILY
Qty: 15 G | Refills: 0 | Status: SHIPPED | OUTPATIENT
Start: 2023-07-26

## 2023-07-26 NOTE — PROGRESS NOTES
"Chief Complaint  crusty painful spot on right breast (Tender moved to the nipple)    Subjective        María Colón presents to [facility]  History of Present Illness  Patient is here today with a new problem.  She started having a rash on her right breast about 6 months ago.  Initially she tried using triple antibiotic ointment and it seemed to improve a lot.  However it never really went away.  Then about 2 weeks ago she started noticing it was crusty and spreading.  Instead of 1 small area it became 2 areas spreading toward her right nipple.  The location of the rash is including some of the right areola but it does not include the nipple.  She has also noticed some tenderness of the breast tissue deep to the rash.  She states that the tissue beneath the rash feels denser and tender.  She does have bilateral breast implants.  She does not know if they are saline or silicone.  They were inserted in 1987 by Dr. Romero.  Her last mammogram was at least 5 years ago.  She also feels that her right breast is somewhat smaller now and she is concerned that there may be some leakage from the right breast implant.  Rash  The current episode started more than 1 month ago. The problem has been gradually worsening since onset. The affected locations include the chest. The rash is characterized by dryness and peeling. It is unknown if there was an exposure to a precipitant. Associated symptoms include fatigue. Pertinent negatives include no anorexia, congestion, cough, diarrhea, eye pain, facial edema, fever, joint pain, rhinorrhea, shortness of breath, sore throat or vomiting.     Objective   Vital Signs:  /78   Pulse 90   Temp 97.5 °F (36.4 °C)   Ht 160 cm (63\")   Wt 90 kg (198 lb 6.4 oz)   SpO2 91%   BMI 35.14 kg/m²   Estimated body mass index is 35.14 kg/m² as calculated from the following:    Height as of this encounter: 160 cm (63\").    Weight as of this encounter: 90 kg (198 " lb 6.4 oz).               Physical Exam  Vitals and nursing note reviewed.   Constitutional:       Appearance: Normal appearance. She is well-developed and normal weight.   HENT:      Head: Normocephalic and atraumatic.      Right Ear: External ear normal.      Left Ear: External ear normal.      Nose: Nose normal.   Eyes:      General: No scleral icterus.     Conjunctiva/sclera: Conjunctivae normal.   Pulmonary:      Effort: Pulmonary effort is normal.   Chest:          Comments: X is the location of the rash.  The blue [place] is the area of dense fibrinous breast tissue deep to the rash  Musculoskeletal:      Cervical back: Normal range of motion and neck supple.   Lymphadenopathy:      Cervical: No cervical adenopathy.      Upper Body:      Right upper body: No axillary adenopathy.      Left upper body: No axillary adenopathy.   Skin:     General: Skin is warm and dry.      Findings: Rash (1 x 2 cm area of mild erythema with a well demarcated border and central clearing located 1.5 cm from the right nipple on the medial side of the breast.) present.   Neurological:      Mental Status: She is alert and oriented to person, place, and time.   Psychiatric:         Mood and Affect: Mood normal.         Behavior: Behavior normal.         Thought Content: Thought content normal.         Judgment: Judgment normal.      Result Review :                   Assessment and Plan   Diagnoses and all orders for this visit:    1. Dermatomycosis (Primary)  -     nystatin (MYCOSTATIN) 828431 UNIT/GM ointment; Apply 1 application  topically to the appropriate area as directed 3 (Three) Times a Day.  Dispense: 15 g; Refill: 0    2. Breast pain, right  -     US Breast Bilateral Limited; Future  -     Mammo Diagnostic Digital Tomosynthesis Bilateral With CAD; Future    3. Mass of upper inner quadrant of right breast  -     US Breast Bilateral Limited; Future  -     Mammo Diagnostic Digital Tomosynthesis Bilateral With CAD; Future    4.  Screening mammogram for breast cancer  -     Mammo Diagnostic Digital Tomosynthesis Bilateral With CAD; Future    Patient is here today for a area of irritation on the right breast with associated mass and tenderness.  I will start with a bilateral diagnostic mammogram and ultrasound of the right breast.  I recommended the patient contact Dr. Romero regarding possible leakage and removal of breast implants.  We will do a trial of nystatin ointment to see if the rash on the right breast responds.  If it completely improves I would still have the patient get the mammogram and follow-up with Dr. Romero as recommended.  However, if the rash does not improve in the next 2 weeks the patient was advised to contact me for further instructions.  I would consider further imaging (possible MRI of the breasts) looking for any leakage of the implants.     I spent 38 minutes caring for María on this date of service. This time includes time spent by me in the following activities:performing a medically appropriate examination and/or evaluation , counseling and educating the patient/family/caregiver, ordering medications, tests, or procedures, and documenting information in the medical record  Follow Up   Return if symptoms worsen or fail to improve.  Patient was given instructions and counseling regarding her condition or for health maintenance advice. Please see specific information pulled into the AVS if appropriate.       Answers submitted by the patient for this visit:  Primary Reason for Visit (Submitted on 7/26/2023)  What is the primary reason for your visit?: Rash

## 2023-07-28 ENCOUNTER — TELEPHONE (OUTPATIENT)
Dept: FAMILY MEDICINE CLINIC | Facility: CLINIC | Age: 63
End: 2023-07-28

## 2023-07-28 NOTE — TELEPHONE ENCOUNTER
SPOKE WITH PATIENT AND INFORMED HER THAT SHE IS SCHEDULED FOR BOTH BUT THEY WILL ONLY PERFORM THE US IF NEEDED

## 2023-07-28 NOTE — TELEPHONE ENCOUNTER
Caller: María Colón    Relationship to patient: Self    Best call back number:     Patient is needing: THE PATIENT STATES THAT SHE WAS TOLD SHE WILL NOT BE HAVING HER ULTRASOUND DONE ON 08/16/2023. INSTEAD, THE PATIENT WILL ONLY HAVE THE DIAGNOSTIC MAMMOGRAM DONE. THE PATIENT WOULD LIKE TO ENSURE THAT THIS IS CORRECT. PLEASE ADVISE.

## 2023-07-28 NOTE — TELEPHONE ENCOUNTER
----- Message from María oClón sent at 7/28/2023  1:05 PM EDT -----  Regarding: appt question  about appt at [facility]  Contact: [phone]  could you please look on my chart and see what they have me down for, they are not doing the ultrasound...that day that you ordered?     Thanks  María Colón

## 2023-08-16 ENCOUNTER — HOSPITAL ENCOUNTER (OUTPATIENT)
Dept: MAMMOGRAPHY | Facility: HOSPITAL | Age: 63
Discharge: HOME | End: 2023-08-16
Payer: MEDICARE

## 2023-08-16 ENCOUNTER — HOSPITAL ENCOUNTER (OUTPATIENT)
Dept: ULTRASOUND IMAGING | Facility: HOSPITAL | Age: 63
Discharge: HOME | End: 2023-08-16
Payer: MEDICARE

## 2023-08-16 DIAGNOSIS — Z12.31 SCREENING MAMMOGRAM FOR BREAST CANCER: ICD-10-CM

## 2023-08-16 DIAGNOSIS — N64.4 BREAST PAIN, RIGHT: ICD-10-CM

## 2023-08-16 DIAGNOSIS — N63.12 MASS OF UPPER INNER QUADRANT OF RIGHT BREAST: ICD-10-CM

## 2023-08-16 PROCEDURE — 76642 ULTRASOUND BREAST LIMITED: CPT

## 2023-08-16 PROCEDURE — G0279 TOMOSYNTHESIS, MAMMO: HCPCS

## 2023-08-16 PROCEDURE — 77066 DX MAMMO INCL CAD BI: CPT

## 2023-08-22 DIAGNOSIS — B36.9 DERMATOMYCOSIS: ICD-10-CM

## 2023-08-22 RX ORDER — NYSTATIN 100000 U/G
OINTMENT TOPICAL
Qty: 15 G | Refills: 0 | Status: SHIPPED | OUTPATIENT
Start: 2023-08-22

## 2023-09-14 ENCOUNTER — TELEMEDICINE (OUTPATIENT)
Dept: FAMILY MEDICINE CLINIC | Facility: TELEHEALTH | Age: 63
End: 2023-09-14
Payer: MEDICARE

## 2023-09-14 DIAGNOSIS — J22 LOWER RESPIRATORY INFECTION: Primary | ICD-10-CM

## 2023-09-14 RX ORDER — METHYLPREDNISOLONE 4 MG/1
TABLET ORAL
Qty: 21 TABLET | Refills: 0 | Status: SHIPPED | OUTPATIENT
Start: 2023-09-14

## 2023-09-14 RX ORDER — AZITHROMYCIN 250 MG/1
TABLET, FILM COATED ORAL
Qty: 6 TABLET | Refills: 0 | Status: SHIPPED | OUTPATIENT
Start: 2023-09-14

## 2023-09-14 RX ORDER — GUAIFENESIN AND DEXTROMETHORPHAN HYDROBROMIDE 600; 30 MG/1; MG/1
1 TABLET, EXTENDED RELEASE ORAL 2 TIMES DAILY PRN
Qty: 20 TABLET | Refills: 0 | Status: SHIPPED | OUTPATIENT
Start: 2023-09-14
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
## 2023-09-25 ENCOUNTER — OFFICE VISIT (OUTPATIENT)
Dept: FAMILY MEDICINE CLINIC | Facility: CLINIC | Age: 63
End: 2023-09-25

## 2023-09-25 VITALS
HEART RATE: 89 BPM | HEIGHT: 63 IN | SYSTOLIC BLOOD PRESSURE: 149 MMHG | OXYGEN SATURATION: 95 % | BODY MASS INDEX: 35.15 KG/M2 | DIASTOLIC BLOOD PRESSURE: 79 MMHG | WEIGHT: 198.4 LBS

## 2023-09-25 DIAGNOSIS — L98.8 LESION OF SKIN OF BREAST: ICD-10-CM

## 2023-09-25 DIAGNOSIS — Z23 NEEDS FLU SHOT: ICD-10-CM

## 2023-09-25 DIAGNOSIS — J44.9 CHRONIC OBSTRUCTIVE PULMONARY DISEASE, UNSPECIFIED COPD TYPE: ICD-10-CM

## 2023-09-25 DIAGNOSIS — Z79.899 ENCOUNTER FOR LONG-TERM (CURRENT) USE OF MEDICATIONS: ICD-10-CM

## 2023-09-25 DIAGNOSIS — E78.2 MIXED HYPERLIPIDEMIA: ICD-10-CM

## 2023-09-25 DIAGNOSIS — K21.9 GASTROESOPHAGEAL REFLUX DISEASE, UNSPECIFIED WHETHER ESOPHAGITIS PRESENT: ICD-10-CM

## 2023-09-25 DIAGNOSIS — I10 PRIMARY HYPERTENSION: Primary | ICD-10-CM

## 2023-09-25 DIAGNOSIS — F43.23 ADJUSTMENT DISORDER WITH MIXED ANXIETY AND DEPRESSED MOOD: ICD-10-CM

## 2023-09-25 PROCEDURE — 3077F SYST BP >= 140 MM HG: CPT | Performed by: FAMILY MEDICINE

## 2023-09-25 PROCEDURE — 1160F RVW MEDS BY RX/DR IN RCRD: CPT | Performed by: FAMILY MEDICINE

## 2023-09-25 PROCEDURE — 90686 IIV4 VACC NO PRSV 0.5 ML IM: CPT | Performed by: FAMILY MEDICINE

## 2023-09-25 PROCEDURE — 3078F DIAST BP <80 MM HG: CPT | Performed by: FAMILY MEDICINE

## 2023-09-25 PROCEDURE — 1159F MED LIST DOCD IN RCRD: CPT | Performed by: FAMILY MEDICINE

## 2023-09-25 PROCEDURE — 99214 OFFICE O/P EST MOD 30 MIN: CPT | Performed by: FAMILY MEDICINE

## 2023-09-25 PROCEDURE — G0008 ADMIN INFLUENZA VIRUS VAC: HCPCS | Performed by: FAMILY MEDICINE

## 2023-09-25 RX ORDER — TRIAMCINOLONE ACETONIDE 1 MG/G
1 OINTMENT TOPICAL 2 TIMES DAILY
Qty: 15 G | Refills: 0 | Status: SHIPPED | OUTPATIENT
Start: 2023-09-25

## 2023-09-25 RX ORDER — IPRATROPIUM BROMIDE AND ALBUTEROL SULFATE 2.5; .5 MG/3ML; MG/3ML
3 SOLUTION RESPIRATORY (INHALATION) EVERY 4 HOURS PRN
Qty: 180 ML | Refills: 0 | Status: SHIPPED | OUTPATIENT
Start: 2023-09-25

## 2023-09-25 NOTE — PROGRESS NOTES
Chief Complaint  Hypertension and spot on breast     Subjective        María Colón presents to [facility]  History of Present Illness  Patient is here today for follow-up on her chronic health conditions:    Hypertension.  The patient states that she was diagnosed approximately in the 1990s.  She takes losartan 50 mg twice daily.  Blood pressures have been well controlled at home,  Usually 120/80.      Hyperlipidemia.  The patient was diagnosed shortly after she was diagnosed with hypertension and she has been on cholesterol medicine for many years.  Currently she takes simvastatin 20 mg daily.  She denies any side effects.    Depression.  Patient takes Celexa 40 mg daily.  Patient is doing well.  Would like to continue it.  No SI/HI    Oxygen dependent COPD.  Patient is still smoking.  She is seeing pulmonology, Dr Rufino Gordillo, and currently she is taking Trelegy and albuterol as needed.    Stress urinary incontinence.  The patient is currently taking Vesicare which helps her symptoms.  She has not tried performing Kegel maneuvers to help with her symptoms.    GERD.  The patient takes pantoprazole 40 mg daily.  Her symptoms are well controlled with the pantoprazole.    Patient is also here to follow-up from the rash that is still near her nipple on the right breast.  She has tried nystatin ointment for 2 weeks and said that the rash seemed to get bigger during that time so she discontinued treatment.  She then had a normal mammogram.  She then tried an antibiotic ointment, Neosporin, which seemed to help some but the rash never went away.  She states it is itchy intermittently.  Rash  This is a recurrent problem. The current episode started more than 1 month ago. The problem has been waxing and waning since onset. The rash is characterized by redness. It is unknown if there was an exposure to a precipitant. Pertinent negatives include no anorexia, congestion, cough, diarrhea,  "eye pain, facial edema, fatigue, fever, joint pain, nail changes, rhinorrhea, shortness of breath, sore throat or vomiting.     Objective   Vital Signs:  /79   Pulse 89   Ht 160 cm (63\")   Wt 90 kg (198 lb 6.4 oz)   SpO2 95%   BMI 35.14 kg/m²   Estimated body mass index is 35.14 kg/m² as calculated from the following:    Height as of this encounter: 160 cm (63\").    Weight as of this encounter: 90 kg (198 lb 6.4 oz).         Physical Exam  Vitals and nursing note reviewed.   Constitutional:       Appearance: Normal appearance. She is well-developed. She is obese.   HENT:      Head: Normocephalic and atraumatic.      Right Ear: External ear normal.      Left Ear: External ear normal.      Nose: Nose normal.   Eyes:      General: No scleral icterus.     Conjunctiva/sclera: Conjunctivae normal.   Cardiovascular:      Rate and Rhythm: Normal rate and regular rhythm.      Heart sounds: Normal heart sounds.   Pulmonary:      Effort: Pulmonary effort is normal.      Breath sounds: Normal breath sounds.   Musculoskeletal:      Cervical back: Normal range of motion and neck supple.      Right lower leg: No edema.      Left lower leg: No edema.   Skin:     General: Skin is warm and dry.      Findings: No rash.   Neurological:      Mental Status: She is alert and oriented to person, place, and time.   Psychiatric:         Mood and Affect: Mood normal.         Behavior: Behavior normal.         Thought Content: Thought content normal.         Judgment: Judgment normal.      Result Review :  The following data was reviewed by: Asuncion Pinto DO on 09/25/2023:  Common labs          3/24/2023    10:35   Common Labs   Glucose 108    BUN 21    Creatinine 0.78    Sodium 143    Potassium 5.1    Chloride 103    Calcium 10.6    Total Protein 7.3    Albumin 4.9    Total Bilirubin 0.5    Alkaline Phosphatase 91    AST (SGOT) 17    ALT (SGPT) 21    WBC 9.05    Hemoglobin 16.3    Hematocrit 46.8    Platelets 303    Total " Cholesterol 143    Triglycerides 167    HDL Cholesterol 40    LDL Cholesterol  74      TSH          3/24/2023    10:35   TSH   TSH 0.705                   Assessment and Plan   Diagnoses and all orders for this visit:    1. Primary hypertension (Primary)  -     Comprehensive Metabolic Panel    2. Mixed hyperlipidemia    3. Gastroesophageal reflux disease, unspecified whether esophagitis present    4. Chronic obstructive pulmonary disease, unspecified COPD type  -     ipratropium-albuterol (DUO-NEB) 0.5-2.5 mg/3 ml nebulizer; Take 3 mL by nebulization Every 4 (Four) Hours As Needed for Wheezing.  Dispense: 180 mL; Refill: 0    5. Adjustment disorder with mixed anxiety and depressed mood    6. Encounter for long-term (current) use of medications  -     CBC & Differential  -     Comprehensive Metabolic Panel    7. Lesion of skin of breast  -     Ambulatory Referral to Dermatology  -     triamcinolone (KENALOG) 0.1 % ointment; Apply 1 application  topically to the appropriate area as directed 2 (Two) Times a Day.  Dispense: 15 g; Refill: 0    8. Needs flu shot  -     Fluzone >6 Months ([id number])      Patient is here today to follow-up on chronic stable hypertension, hyperlipidemia, GERD, COPD and anxiety.  Surveillance labs were obtained today and any medication changes will be made based on lab results and will be called to the patient later this week.     Rash on right breast.  We will try a trial of triamcinolone ointment.  If it is worsening the patient was advised to discontinue the ointment.  However, if it is improving she may continue it for no longer than 2 weeks.  I have also entered a referral to dermatology for biopsy.       Follow Up   Return in 6 months (on 3/25/2024) for Medicare Wellness, HTN.  Patient was given instructions and counseling regarding her condition or for health maintenance advice. Please see specific information pulled into the AVS if appropriate.       Answers submitted by the patient  for this visit:  Primary Reason for Visit (Submitted on 9/22/2023)  What is the primary reason for your visit?: Rash

## 2023-09-26 LAB
ALBUMIN SERPL-MCNC: 4.6 G/DL (ref 3.5–5.2)
ALBUMIN/GLOB SERPL: 1.8 G/DL
ALP SERPL-CCNC: 81 U/L (ref 39–117)
ALT SERPL-CCNC: 29 U/L (ref 1–33)
AST SERPL-CCNC: 24 U/L (ref 1–32)
BASOPHILS # BLD AUTO: 0.09 10*3/MM3 (ref 0–0.2)
BASOPHILS NFR BLD AUTO: 0.9 % (ref 0–1.5)
BILIRUB SERPL-MCNC: 0.3 MG/DL (ref 0–1.2)
BUN SERPL-MCNC: 18 MG/DL (ref 8–23)
BUN/CREAT SERPL: 28.6 (ref 7–25)
CALCIUM SERPL-MCNC: 10 MG/DL (ref 8.6–10.5)
CHLORIDE SERPL-SCNC: 100 MMOL/L (ref 98–107)
CO2 SERPL-SCNC: 27.2 MMOL/L (ref 22–29)
CREAT SERPL-MCNC: 0.63 MG/DL (ref 0.57–1)
EGFRCR SERPLBLD CKD-EPI 2021: 100.4 ML/MIN/1.73
EOSINOPHIL # BLD AUTO: 0.21 10*3/MM3 (ref 0–0.4)
EOSINOPHIL NFR BLD AUTO: 2.1 % (ref 0.3–6.2)
ERYTHROCYTE [DISTWIDTH] IN BLOOD BY AUTOMATED COUNT: 12.1 % (ref 12.3–15.4)
GLOBULIN SER CALC-MCNC: 2.6 GM/DL
GLUCOSE SERPL-MCNC: 99 MG/DL (ref 65–99)
HCT VFR BLD AUTO: 44.4 % (ref 34–46.6)
HGB BLD-MCNC: 14.9 G/DL (ref 12–15.9)
IMM GRANULOCYTES # BLD AUTO: 0.21 10*3/MM3 (ref 0–0.05)
IMM GRANULOCYTES NFR BLD AUTO: 2.1 % (ref 0–0.5)
LYMPHOCYTES # BLD AUTO: 1.62 10*3/MM3 (ref 0.7–3.1)
LYMPHOCYTES NFR BLD AUTO: 15.8 % (ref 19.6–45.3)
MCH RBC QN AUTO: 30.5 PG (ref 26.6–33)
MCHC RBC AUTO-ENTMCNC: 33.6 G/DL (ref 31.5–35.7)
MCV RBC AUTO: 90.8 FL (ref 79–97)
MONOCYTES # BLD AUTO: 0.75 10*3/MM3 (ref 0.1–0.9)
MONOCYTES NFR BLD AUTO: 7.3 % (ref 5–12)
NEUTROPHILS # BLD AUTO: 7.35 10*3/MM3 (ref 1.7–7)
NEUTROPHILS NFR BLD AUTO: 71.8 % (ref 42.7–76)
NRBC BLD AUTO-RTO: 0 /100 WBC (ref 0–0.2)
PLATELET # BLD AUTO: 300 10*3/MM3 (ref 140–450)
POTASSIUM SERPL-SCNC: 4.6 MMOL/L (ref 3.5–5.2)
PROT SERPL-MCNC: 7.2 G/DL (ref 6–8.5)
RBC # BLD AUTO: 4.89 10*6/MM3 (ref 3.77–5.28)
SODIUM SERPL-SCNC: 140 MMOL/L (ref 136–145)
WBC # BLD AUTO: 10.23 10*3/MM3 (ref 3.4–10.8)

## 2023-10-01 DIAGNOSIS — J44.9 CHRONIC OBSTRUCTIVE PULMONARY DISEASE, UNSPECIFIED COPD TYPE: ICD-10-CM

## 2023-10-02 RX ORDER — IPRATROPIUM BROMIDE AND ALBUTEROL SULFATE 2.5; .5 MG/3ML; MG/3ML
SOLUTION RESPIRATORY (INHALATION)
Qty: 180 ML | Refills: 0 | OUTPATIENT
Start: 2023-10-02

## 2023-10-09 DIAGNOSIS — J44.9 CHRONIC OBSTRUCTIVE PULMONARY DISEASE, UNSPECIFIED COPD TYPE: ICD-10-CM

## 2023-10-09 DIAGNOSIS — N39.3 URINARY, INCONTINENCE, STRESS FEMALE: ICD-10-CM

## 2023-10-09 DIAGNOSIS — E78.2 MIXED HYPERLIPIDEMIA: ICD-10-CM

## 2023-10-09 DIAGNOSIS — L98.8 LESION OF SKIN OF BREAST: ICD-10-CM

## 2023-10-09 DIAGNOSIS — I10 PRIMARY HYPERTENSION: ICD-10-CM

## 2023-10-09 DIAGNOSIS — F43.23 ADJUSTMENT DISORDER WITH MIXED ANXIETY AND DEPRESSED MOOD: ICD-10-CM

## 2023-10-09 RX ORDER — NYSTATIN 100000 U/G
OINTMENT TOPICAL SEE ADMIN INSTRUCTIONS
Qty: 15 G | Refills: 0 | Status: SHIPPED | OUTPATIENT
Start: 2023-10-09

## 2023-10-09 RX ORDER — LOSARTAN POTASSIUM 50 MG/1
50 TABLET ORAL 2 TIMES DAILY
Qty: 180 TABLET | Refills: 1 | Status: SHIPPED | OUTPATIENT
Start: 2023-10-09

## 2023-10-09 RX ORDER — SIMVASTATIN 20 MG
20 TABLET ORAL NIGHTLY
Qty: 90 TABLET | Refills: 3 | Status: SHIPPED | OUTPATIENT
Start: 2023-10-09

## 2023-10-09 RX ORDER — TRIAMCINOLONE ACETONIDE 1 MG/G
OINTMENT TOPICAL
Qty: 15 G | Refills: 0 | Status: SHIPPED | OUTPATIENT
Start: 2023-10-09

## 2023-10-09 RX ORDER — CITALOPRAM 40 MG/1
40 TABLET ORAL NIGHTLY
Qty: 90 TABLET | Refills: 1 | Status: SHIPPED | OUTPATIENT
Start: 2023-10-09

## 2023-10-09 RX ORDER — IPRATROPIUM BROMIDE AND ALBUTEROL SULFATE 2.5; .5 MG/3ML; MG/3ML
SOLUTION RESPIRATORY (INHALATION)
Qty: 180 ML | Refills: 0 | Status: SHIPPED | OUTPATIENT
Start: 2023-10-09

## 2023-10-09 RX ORDER — SOLIFENACIN SUCCINATE 10 MG/1
10 TABLET, FILM COATED ORAL EVERY MORNING
Qty: 90 TABLET | Refills: 1 | Status: SHIPPED | OUTPATIENT
Start: 2023-10-09

## 2023-10-19 DIAGNOSIS — J44.9 CHRONIC OBSTRUCTIVE PULMONARY DISEASE, UNSPECIFIED COPD TYPE: ICD-10-CM

## 2023-10-19 RX ORDER — IPRATROPIUM BROMIDE AND ALBUTEROL SULFATE 2.5; .5 MG/3ML; MG/3ML
SOLUTION RESPIRATORY (INHALATION)
Qty: 360 ML | Refills: 0 | Status: SHIPPED | OUTPATIENT
Start: 2023-10-19

## 2023-11-27 ENCOUNTER — TELEMEDICINE (OUTPATIENT)
Dept: FAMILY MEDICINE CLINIC | Facility: TELEHEALTH | Age: 63
End: 2023-11-27
Payer: MEDICARE

## 2023-11-27 DIAGNOSIS — J22 LOWER RESPIRATORY INFECTION (E.G., BRONCHITIS, PNEUMONIA, PNEUMONITIS, PULMONITIS): Primary | ICD-10-CM

## 2023-11-27 RX ORDER — MOMETASONE FUROATE 1 MG/G
CREAM TOPICAL
COMMUNITY
Start: 2023-10-13

## 2023-11-27 RX ORDER — DEXTROMETHORPHAN HYDROBROMIDE AND PROMETHAZINE HYDROCHLORIDE 15; 6.25 MG/5ML; MG/5ML
5 SYRUP ORAL 4 TIMES DAILY PRN
Qty: 200 ML | Refills: 0 | Status: SHIPPED | OUTPATIENT
Start: 2023-11-27

## 2023-11-27 RX ORDER — PREDNISONE 10 MG/1
TABLET ORAL
Qty: 21 TABLET | Refills: 0 | Status: SHIPPED | OUTPATIENT
Start: 2023-11-27

## 2023-11-27 RX ORDER — AMOXICILLIN AND CLAVULANATE POTASSIUM 875; 125 MG/1; MG/1
1 TABLET, FILM COATED ORAL 2 TIMES DAILY
Qty: 20 TABLET | Refills: 0 | Status: SHIPPED | OUTPATIENT
Start: 2023-11-27 | End: 2023-12-07
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
## 2023-12-07 DIAGNOSIS — K21.9 GASTROESOPHAGEAL REFLUX DISEASE, UNSPECIFIED WHETHER ESOPHAGITIS PRESENT: ICD-10-CM

## 2023-12-07 DIAGNOSIS — F43.23 ADJUSTMENT DISORDER WITH MIXED ANXIETY AND DEPRESSED MOOD: ICD-10-CM

## 2023-12-07 DIAGNOSIS — E78.2 MIXED HYPERLIPIDEMIA: ICD-10-CM

## 2023-12-07 DIAGNOSIS — N39.3 URINARY, INCONTINENCE, STRESS FEMALE: ICD-10-CM

## 2023-12-07 DIAGNOSIS — I10 PRIMARY HYPERTENSION: ICD-10-CM

## 2023-12-07 RX ORDER — CITALOPRAM 40 MG/1
40 TABLET ORAL NIGHTLY
Qty: 90 TABLET | Refills: 1 | Status: SHIPPED | OUTPATIENT
Start: 2023-12-07

## 2023-12-07 RX ORDER — LOSARTAN POTASSIUM 50 MG/1
50 TABLET ORAL 2 TIMES DAILY
Qty: 180 TABLET | Refills: 1 | Status: SHIPPED | OUTPATIENT
Start: 2023-12-07

## 2023-12-07 RX ORDER — PANTOPRAZOLE SODIUM 40 MG/1
40 TABLET, DELAYED RELEASE ORAL DAILY
Qty: 90 TABLET | Refills: 1 | Status: SHIPPED | OUTPATIENT
Start: 2023-12-07

## 2023-12-07 RX ORDER — SIMVASTATIN 20 MG
20 TABLET ORAL NIGHTLY
Qty: 90 TABLET | Refills: 1 | Status: SHIPPED | OUTPATIENT
Start: 2023-12-07

## 2023-12-07 RX ORDER — SOLIFENACIN SUCCINATE 10 MG/1
10 TABLET, FILM COATED ORAL EVERY MORNING
Qty: 90 TABLET | Refills: 1 | Status: SHIPPED | OUTPATIENT
Start: 2023-12-07

## 2024-02-07 ENCOUNTER — TELEMEDICINE (OUTPATIENT)
Dept: FAMILY MEDICINE CLINIC | Facility: TELEHEALTH | Age: 64
End: 2024-02-07
Payer: MEDICARE

## 2024-02-07 DIAGNOSIS — J01.00 ACUTE MAXILLARY SINUSITIS, RECURRENCE NOT SPECIFIED: Primary | ICD-10-CM

## 2024-02-07 RX ORDER — PREDNISONE 10 MG/1
TABLET ORAL
Qty: 21 TABLET | Refills: 0 | Status: SHIPPED | OUTPATIENT
Start: 2024-02-07

## 2024-02-07 RX ORDER — DEXTROMETHORPHAN HYDROBROMIDE AND PROMETHAZINE HYDROCHLORIDE 15; 6.25 MG/5ML; MG/5ML
5 SYRUP ORAL NIGHTLY PRN
Qty: 120 ML | Refills: 0 | Status: SHIPPED | OUTPATIENT
Start: 2024-02-07

## 2024-02-07 RX ORDER — AMOXICILLIN AND CLAVULANATE POTASSIUM 875; 125 MG/1; MG/1
1 TABLET, FILM COATED ORAL 2 TIMES DAILY
Qty: 20 TABLET | Refills: 0 | Status: SHIPPED | OUTPATIENT
Start: 2024-02-07 | End: 2024-02-17
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
## 2024-12-04 DIAGNOSIS — N39.3 URINARY, INCONTINENCE, STRESS FEMALE: ICD-10-CM

## 2024-12-04 RX ORDER — SOLIFENACIN SUCCINATE 10 MG/1
10 TABLET, FILM COATED ORAL EVERY MORNING
Qty: 90 TABLET | Refills: 1 | OUTPATIENT
Start: 2024-12-04

## 2024-12-04 NOTE — TELEPHONE ENCOUNTER
Rx Refill Note  Requested Prescriptions     Pending Prescriptions Disp Refills    solifenacin (VESICARE) 10 MG tablet [Pharmacy Med Name: SOLIFENACIN 10 MG TABLET] 90 tablet 1     Sig: TAKE ONE TABLET BY MOUTH EVERY MORNING      Last office visit with prescribing clinician: 9/25/2023   Last telemedicine visit with prescribing clinician: Visit date not found   Next office visit with prescribing clinician: Visit date not found                         Would you like a call back once the refill request has been completed: [] Yes [] No    If the office needs to give you a call back, can they leave a voicemail: [] Yes [] No    Rand Servin MA  12/04/24, 07:47 EST

## 2024-12-20 DIAGNOSIS — F43.23 ADJUSTMENT DISORDER WITH MIXED ANXIETY AND DEPRESSED MOOD: ICD-10-CM

## 2024-12-20 RX ORDER — CITALOPRAM HYDROBROMIDE 40 MG/1
40 TABLET ORAL NIGHTLY
Qty: 90 TABLET | Refills: 1 | OUTPATIENT
Start: 2024-12-20

## 2025-03-22 DIAGNOSIS — I10 PRIMARY HYPERTENSION: ICD-10-CM

## 2025-03-22 DIAGNOSIS — K21.9 GASTROESOPHAGEAL REFLUX DISEASE, UNSPECIFIED WHETHER ESOPHAGITIS PRESENT: ICD-10-CM

## 2025-03-22 DIAGNOSIS — E78.2 MIXED HYPERLIPIDEMIA: ICD-10-CM

## 2025-03-24 RX ORDER — LOSARTAN POTASSIUM 50 MG/1
50 TABLET ORAL 2 TIMES DAILY
Qty: 180 TABLET | Refills: 1 | OUTPATIENT
Start: 2025-03-24

## 2025-03-24 RX ORDER — PANTOPRAZOLE SODIUM 40 MG/1
40 TABLET, DELAYED RELEASE ORAL DAILY
Qty: 90 TABLET | Refills: 1 | OUTPATIENT
Start: 2025-03-24

## 2025-03-24 RX ORDER — SIMVASTATIN 20 MG
20 TABLET ORAL NIGHTLY
Qty: 90 TABLET | Refills: 1 | OUTPATIENT
Start: 2025-03-24

## (undated) DEVICE — DRSNG GZ PETROLTM XEROFORM CURAD 1X8IN STRL

## (undated) DEVICE — DRN WND JP RND W TROC SIL 10F 1/8IN

## (undated) DEVICE — BANDAGE,GAUZE,BULKEE II,4.5"X4.1YD,STRL: Brand: MEDLINE

## (undated) DEVICE — DRSNG WND GEL FIBR OPTICELL AG PLS W/SLV LF 4X5IN  STRL

## (undated) DEVICE — SPNG LAP 18X18IN LF STRL PK/5

## (undated) DEVICE — BNDG ELAS ELITE V/CLOSE 4IN 5YD LF STRL

## (undated) DEVICE — SUT MNCRYL 3/0 PS2 18IN Y497G

## (undated) DEVICE — DRSNG HYDROCOLLD EXUDERM SATN 6X6IN

## (undated) DEVICE — SUT ETHLN 4/0 PS2 PLSTC 1667G

## (undated) DEVICE — SPNG GZ WOVN 4X4IN 12PLY 10/BX STRL

## (undated) DEVICE — SUT MNCRYL PLS ANTIB UD 4/0 PS2 18IN

## (undated) DEVICE — KT ORCA ORCAPOD DISP STRL

## (undated) DEVICE — STCKNT IMPERV 9X36IN STRL

## (undated) DEVICE — GLV SURG BIOGEL SENSR LTX PF SZ7.5

## (undated) DEVICE — ELECTRD BLD EXT EDGE 1P COAT 6.5IN STRL

## (undated) DEVICE — LOU MINOR PROCEDURE: Brand: MEDLINE INDUSTRIES, INC.

## (undated) DEVICE — SAFELINER SUCTION CANISTER 1000CC: Brand: DEROYAL

## (undated) DEVICE — JACKSON-PRATT 100CC BULB RESERVOIR: Brand: CARDINAL HEALTH

## (undated) DEVICE — PREMIUM WET SKIN PREP TRAY: Brand: MEDLINE INDUSTRIES, INC.

## (undated) DEVICE — SUT MNCRYL 4/0 PS2 18 IN

## (undated) DEVICE — SUT ETHLN 4/0 PS2 18IN 1667H

## (undated) DEVICE — GLV SURG BIOGEL LTX PF 7 1/2

## (undated) DEVICE — ARM SLING: Brand: DEROYAL

## (undated) DEVICE — Device

## (undated) DEVICE — SUT MNCRYL 5/0 P3 18 IN Y493G

## (undated) DEVICE — PAD,ABDOMINAL,8"X10",ST,LF: Brand: MEDLINE

## (undated) DEVICE — ADAPT CLN BIOGUARD AIR/H2O DISP

## (undated) DEVICE — PK ORTHO MINOR TOWER 40

## (undated) DEVICE — IRRIGATOR BULB ASEPTO 60CC STRL

## (undated) DEVICE — FRCP BX RADJAW4 NDL 2.8 240CM LG OG BX40

## (undated) DEVICE — SYR LL 3CC

## (undated) DEVICE — SUT ETHLN 3/0 PS1 18IN 1663H

## (undated) DEVICE — BW-412T DISP COMBO CLEANING BRUSH: Brand: SINGLE USE COMBINATION CLEANING BRUSH

## (undated) DEVICE — VIAL FORMALIN CAP 10P 40ML